# Patient Record
Sex: MALE | Race: WHITE | Employment: FULL TIME | URBAN - METROPOLITAN AREA
[De-identification: names, ages, dates, MRNs, and addresses within clinical notes are randomized per-mention and may not be internally consistent; named-entity substitution may affect disease eponyms.]

---

## 2017-01-10 ENCOUNTER — OFFICE VISIT (OUTPATIENT)
Dept: FAMILY MEDICINE CLINIC | Facility: CLINIC | Age: 67
End: 2017-01-10

## 2017-01-10 VITALS
HEART RATE: 66 BPM | WEIGHT: 220.81 LBS | DIASTOLIC BLOOD PRESSURE: 84 MMHG | TEMPERATURE: 98 F | RESPIRATION RATE: 17 BRPM | BODY MASS INDEX: 27.46 KG/M2 | HEIGHT: 75 IN | SYSTOLIC BLOOD PRESSURE: 143 MMHG

## 2017-01-10 DIAGNOSIS — M54.50 BILATERAL LOW BACK PAIN WITHOUT SCIATICA, UNSPECIFIED CHRONICITY: ICD-10-CM

## 2017-01-10 DIAGNOSIS — S99.922S FOOT INJURY, LEFT, SEQUELA: ICD-10-CM

## 2017-01-10 DIAGNOSIS — D36.10 SCHWANNOMA: ICD-10-CM

## 2017-01-10 DIAGNOSIS — I10 ESSENTIAL HYPERTENSION: ICD-10-CM

## 2017-01-10 DIAGNOSIS — M45.9 ANKYLOSING SPONDYLITIS (HCC): Primary | ICD-10-CM

## 2017-01-10 PROCEDURE — 99214 OFFICE O/P EST MOD 30 MIN: CPT | Performed by: FAMILY MEDICINE

## 2017-01-10 PROCEDURE — 99212 OFFICE O/P EST SF 10 MIN: CPT | Performed by: FAMILY MEDICINE

## 2017-01-10 NOTE — PROGRESS NOTES
HPI:    Patient ID: Katie Da Silva is a 73year old male. Low Back Pain  The current episode started more than 1 month ago. The problem occurs daily. The problem has been gradually improving since onset. The pain is present in the lumbar spine.  The quality Neck: No JVD present. Cardiovascular: Normal rate, regular rhythm, normal heart sounds and intact distal pulses. No murmur heard.   Pulmonary/Chest: Effort normal and breath sounds normal.   Musculoskeletal:   See below   Lymphadenopathy:     He has

## 2017-01-23 ENCOUNTER — OFFICE VISIT (OUTPATIENT)
Dept: RHEUMATOLOGY | Facility: CLINIC | Age: 67
End: 2017-01-23

## 2017-01-23 VITALS
SYSTOLIC BLOOD PRESSURE: 147 MMHG | BODY MASS INDEX: 27.69 KG/M2 | WEIGHT: 222.69 LBS | HEIGHT: 75 IN | DIASTOLIC BLOOD PRESSURE: 81 MMHG | HEART RATE: 64 BPM

## 2017-01-23 DIAGNOSIS — Z51.81 THERAPEUTIC DRUG MONITORING: ICD-10-CM

## 2017-01-23 DIAGNOSIS — M45.9 AS (ANKYLOSING SPONDYLITIS) (HCC): Primary | ICD-10-CM

## 2017-01-23 PROCEDURE — 99214 OFFICE O/P EST MOD 30 MIN: CPT | Performed by: INTERNAL MEDICINE

## 2017-01-23 PROCEDURE — 99212 OFFICE O/P EST SF 10 MIN: CPT | Performed by: INTERNAL MEDICINE

## 2017-01-24 NOTE — PATIENT INSTRUCTIONS
1. Cont prednisone 15mx 1 week, then cut prednisone to 10mg a day -   2. advil 200mg a day - 1-2 times a day  - ok to take at night. 3. Ok to try to taper Prednisone 10mg a day x 2 weeks, then try to cut to 5mg a day x weeks - -   4.  Return to clinic in

## 2017-01-24 NOTE — PROGRESS NOTES
Narcisa Mckeon is a 73year old male who presents for Patient presents with: Ankylosing Spondylitis  . HPI:     I had the pleasure of seeing Narcisa Mckeon on 12/19/2016 for evaluation. He's a pleasant 73year old who has joint pain and elevated sed rate. After he got back. He noticed more pain in his hisp in the care. Then his left knee started hurting. He has an injury of his left knee after slipping about 4 years ago. It has recovered. He has slipped  Twice since but it's recovrered.      About 1 month Body mass index is 27.84 kg/(m^2). Current Outpatient Prescriptions:  predniSONE 5 MG Oral Tab Take 4 tablets (20 mg total) by mouth daily. Take 4 tabs every day. (Patient taking differently: Take 15 mg by mouth daily.  Take 3 tabs every day. ) Disp: Other brother cancer passed away      Social History:    Smoking Status: Former Smoker                   Packs/Day: 0.00  Years:         Alcohol Use: Yes                Comment: wine, daily     ,   [employer],   [place]   Left elft 3rd toe - not tender , muich better - not swollne dactiylitisresolved  - not tender  -     Left heel not tender  No achiles tendnerss  No si joint tenderness at this time - but points to glueta larea   No hip tendneress  Can SED RATE      0-20 mm/Hr 36 (H)   C-REACTIVE PROTEIN      0.0-0.9 mg/dL 3.4 (H)   LYME SCREEN IgG AND IgM (S)      Negative Negative   URIC ACID      3.3-8.7 mg/dL 3.7   HEPATITIS C VIRUS ANTIBODY      Nonreactive Nonreactive     Component      Latest Ref    neural foraminal encroachment. Severe bilateral neural foraminal     impingement is also seen at L5-S1.    7/9/2016 - ct kidney  1. Right-sided nonobstructing renal calculi are present measuring up to 1.0     cm.     2.  There is an indeterminate 3.2 cm

## 2017-02-21 ENCOUNTER — OFFICE VISIT (OUTPATIENT)
Dept: FAMILY MEDICINE CLINIC | Facility: CLINIC | Age: 67
End: 2017-02-21

## 2017-02-21 VITALS
BODY MASS INDEX: 27.85 KG/M2 | WEIGHT: 224 LBS | TEMPERATURE: 99 F | DIASTOLIC BLOOD PRESSURE: 81 MMHG | HEART RATE: 67 BPM | HEIGHT: 75 IN | SYSTOLIC BLOOD PRESSURE: 142 MMHG | RESPIRATION RATE: 18 BRPM

## 2017-02-21 DIAGNOSIS — I10 ESSENTIAL HYPERTENSION: Primary | ICD-10-CM

## 2017-02-21 DIAGNOSIS — M45.9 ANKYLOSING SPONDYLITIS (HCC): ICD-10-CM

## 2017-02-21 DIAGNOSIS — S99.922S FOOT INJURY, LEFT, SEQUELA: ICD-10-CM

## 2017-02-21 DIAGNOSIS — D36.10 SCHWANNOMA: ICD-10-CM

## 2017-02-21 PROCEDURE — 99213 OFFICE O/P EST LOW 20 MIN: CPT | Performed by: FAMILY MEDICINE

## 2017-02-21 PROCEDURE — 99212 OFFICE O/P EST SF 10 MIN: CPT | Performed by: FAMILY MEDICINE

## 2017-02-22 NOTE — PROGRESS NOTES
HPI:    Patient ID: Kate Allison is a 73year old male. Hypertension  This is a chronic problem. The current episode started more than 1 month ago. The problem is unchanged. The problem is controlled.  Pertinent negatives include no anxiety, blurred visio rhythm, normal heart sounds and intact distal pulses. No murmur heard.   Pulmonary/Chest: Effort normal and breath sounds normal.   Musculoskeletal:   Range of motion lumbar spine normal.  Left foot with trace swelling distal dorsal.   Lymphadenopathy:

## 2017-02-23 ENCOUNTER — APPOINTMENT (OUTPATIENT)
Dept: LAB | Age: 67
End: 2017-02-23
Attending: INTERNAL MEDICINE
Payer: COMMERCIAL

## 2017-02-23 DIAGNOSIS — M45.9 AS (ANKYLOSING SPONDYLITIS) (HCC): ICD-10-CM

## 2017-02-23 LAB
CRP SERPL-MCNC: <0.5 MG/DL (ref 0–0.9)
ERYTHROCYTE [SEDIMENTATION RATE] IN BLOOD: 9 MM/HR (ref 0–20)

## 2017-02-23 PROCEDURE — 36415 COLL VENOUS BLD VENIPUNCTURE: CPT

## 2017-02-23 PROCEDURE — 86140 C-REACTIVE PROTEIN: CPT

## 2017-02-23 PROCEDURE — 85652 RBC SED RATE AUTOMATED: CPT

## 2017-02-27 ENCOUNTER — OFFICE VISIT (OUTPATIENT)
Dept: RHEUMATOLOGY | Facility: CLINIC | Age: 67
End: 2017-02-27

## 2017-02-27 VITALS
BODY MASS INDEX: 27.77 KG/M2 | WEIGHT: 223.38 LBS | HEIGHT: 75 IN | SYSTOLIC BLOOD PRESSURE: 150 MMHG | HEART RATE: 69 BPM | DIASTOLIC BLOOD PRESSURE: 83 MMHG

## 2017-02-27 DIAGNOSIS — Z51.81 THERAPEUTIC DRUG MONITORING: ICD-10-CM

## 2017-02-27 DIAGNOSIS — M45.9 AS (ANKYLOSING SPONDYLITIS) (HCC): Primary | ICD-10-CM

## 2017-02-27 PROCEDURE — 99214 OFFICE O/P EST MOD 30 MIN: CPT | Performed by: INTERNAL MEDICINE

## 2017-02-27 PROCEDURE — 99212 OFFICE O/P EST SF 10 MIN: CPT | Performed by: INTERNAL MEDICINE

## 2017-02-27 RX ORDER — PREDNISONE 2.5 MG
2.5 TABLET ORAL DAILY
Qty: 30 TABLET | Refills: 2 | Status: SHIPPED | OUTPATIENT
Start: 2017-02-27 | End: 2017-05-03

## 2017-02-27 RX ORDER — PREDNISONE 1 MG/1
5 TABLET ORAL DAILY
COMMUNITY
End: 2017-05-03

## 2017-02-27 NOTE — PATIENT INSTRUCTIONS
1. Cont prednisone  - ok to cut to 2.5mg a day - x 1 -2 month and then off -   2. advil 200mg a day - 1-2 times a day  - can take advil at night - cont this   3. Return to clinic in 6-8 weeks.    4. Consider sulfasalazine in future

## 2017-02-27 NOTE — PROGRESS NOTES
Benton Schwarz is a 73year old male who presents for Patient presents with: Ankylosing Spondylitis  . HPI:     I had the pleasure of seeing Benton Schwarz on 12/19/2016 for evaluation. He's a pleasant 73year old who has joint pain and elevated sed rate. After he got back. He noticed more pain in his hisp in the care. Then his left knee started hurting. He has an injury of his left knee after slipping about 4 years ago. It has recovered. He has slipped  Twice since but it's recovrered.      About 1 month hes' been on prednisone 5mg a day for the last 2 weeks. He ehas been havign 1/10 pain - mild over the top so fhis hips and his neck. He also has foot pain. He felt a little sitffer. If he stays active. He's been taking one advil at night.    He saw dr. Mariia Law Family History   Problem Relation Age of Onset   • Heart Disease Father 78     congestive heart failure (78 onset); triple bypass surgery   • Cancer Father      lung   • Heart Disorder Father      coronary bypass   • arthritis[other] [OTHER] Father    • St All other ROS are negative.      EXAM:   /83 mmHg  Pulse 69  Ht 6' 3\" (1.905 m)  Wt 223 lb 6.4 oz (101.334 kg)  BMI 27.92 kg/m2  HEENT: Clear oropharynx, no oral ulcers, EOM intact, clear sclear, PERRLA, pleasant, no acute distress, no CAD, no neck t Mean Corpuscular Hemoglobin      27.0-32.0 pg 29.2   Mean Corpuscular HGB Conc      32.0-37.0 g/dl 33.3   RDW      11.0-15.0 % 14.4   Platelet Count      827-966 K/   MEAN PLATELET VOLUME      7.4-10.3 fL 7.0 (L)   Neutrophils %       75   Lymphocyte    heterogeneous ovoid lesion extending into the right psoas musculature.     This measures up to 3.9 cm in greatest dimension and appears to     communicate with the right L2-L3 neural foramen.  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

## 2017-05-03 ENCOUNTER — OFFICE VISIT (OUTPATIENT)
Dept: RHEUMATOLOGY | Facility: CLINIC | Age: 67
End: 2017-05-03

## 2017-05-03 VITALS
BODY MASS INDEX: 28.09 KG/M2 | SYSTOLIC BLOOD PRESSURE: 156 MMHG | HEART RATE: 54 BPM | WEIGHT: 225.88 LBS | TEMPERATURE: 98 F | DIASTOLIC BLOOD PRESSURE: 86 MMHG | HEIGHT: 75 IN

## 2017-05-03 DIAGNOSIS — M45.9 ANKYLOSING SPONDYLITIS, UNSPECIFIED SITE OF SPINE (HCC): Primary | ICD-10-CM

## 2017-05-03 DIAGNOSIS — Z51.81 THERAPEUTIC DRUG MONITORING: ICD-10-CM

## 2017-05-03 PROCEDURE — 99214 OFFICE O/P EST MOD 30 MIN: CPT | Performed by: INTERNAL MEDICINE

## 2017-05-03 PROCEDURE — 99213 OFFICE O/P EST LOW 20 MIN: CPT | Performed by: INTERNAL MEDICINE

## 2017-05-03 NOTE — PROGRESS NOTES
iJmbo Lozano is a 78year old male who presents for Patient presents with: Ankylosing Spondylitis  . HPI:     I had the pleasure of seeing Jimbo Lozano on 12/19/2016 for evaluation. He's a pleasant 73year old who has joint pain and elevated sed rate. After he got back. He noticed more pain in his hisp in the care. Then his left knee started hurting. He has an injury of his left knee after slipping about 4 years ago. It has recovered. He has slipped  Twice since but it's recovrered.      About 1 month hes' been on prednisone 5mg a day for the last 2 weeks. He ehas been havign 1/10 pain - mild over the top so fhis hips and his neck. He also has foot pain. He felt a little sitffer. If he stays active. He's been taking one advil at night.    He saw dr. Christa Ferrer Current Outpatient Prescriptions:  TURMERIC OR Take by mouth. Take 1 cap every day. Disp:  Rfl:    Misc Natural Products (GLUCOSAMINE CHONDROITIN MSM OR) Take by mouth daily. Take 2 tabs daily Disp:  Rfl:    NON FORMULARY Zeaxanthin take one tab every day. Just got back from cruise in march - [place], [address] [facility] [place],         REVIEW OF SYSTEMS:   Review Of Systems:  Fatigue  Constitutional:No fever, no change in weight or appetitie, was having night sweats - a coupl Left heel not tender  No achiles tendnerss  No si joint tenderness   No hip tendneress  Can touch toes   schober's 10-13cm  Last time -   Good rom - good ext and internal rotation of hips       Component      Latest Ref Rng 12/7/2016   Glucose      70-99 m Component      Latest Ref Rng 12/19/2016 12/7/2016 8/14/2016   ANTI-SJOGREN'S A (S)      Negative Negative     ANTI-SJOGREN'S B (S)      Negative Negative     SED RATE (ESR) (L)      0 - 20 MM/HR   19   SED RATE      0-20 mm/Hr  36 (H)    C-REACTIVE PROTEI 1. Right-sided nonobstructing renal calculi are present measuring up to 1.0     cm.     2. There is an indeterminate 3.2 cm fluid collection in the medial right     psoas extending towards the right L3-L4 neural foramen.  This could     represent a perineur

## 2017-05-20 ENCOUNTER — APPOINTMENT (OUTPATIENT)
Dept: LAB | Age: 67
End: 2017-05-20
Attending: INTERNAL MEDICINE
Payer: COMMERCIAL

## 2017-05-20 ENCOUNTER — HOSPITAL ENCOUNTER (OUTPATIENT)
Age: 67
Discharge: HOME | End: 2017-05-20
Payer: COMMERCIAL

## 2017-05-20 VITALS
HEART RATE: 81 BPM | OXYGEN SATURATION: 97 % | TEMPERATURE: 99 F | DIASTOLIC BLOOD PRESSURE: 83 MMHG | RESPIRATION RATE: 20 BRPM | SYSTOLIC BLOOD PRESSURE: 162 MMHG

## 2017-05-20 DIAGNOSIS — H10.31 ACUTE CONJUNCTIVITIS OF RIGHT EYE, UNSPECIFIED ACUTE CONJUNCTIVITIS TYPE: Primary | ICD-10-CM

## 2017-05-20 DIAGNOSIS — M45.9 ANKYLOSING SPONDYLITIS, UNSPECIFIED SITE OF SPINE (HCC): ICD-10-CM

## 2017-05-20 PROCEDURE — 99204 OFFICE O/P NEW MOD 45 MIN: CPT

## 2017-05-20 PROCEDURE — 86140 C-REACTIVE PROTEIN: CPT

## 2017-05-20 PROCEDURE — 80053 COMPREHEN METABOLIC PANEL: CPT

## 2017-05-20 PROCEDURE — 85652 RBC SED RATE AUTOMATED: CPT

## 2017-05-20 PROCEDURE — 36415 COLL VENOUS BLD VENIPUNCTURE: CPT

## 2017-05-20 PROCEDURE — 99213 OFFICE O/P EST LOW 20 MIN: CPT

## 2017-05-20 RX ORDER — PURIFIED WATER 986 MG/ML
2 SOLUTION OPHTHALMIC ONCE
Status: COMPLETED | OUTPATIENT
Start: 2017-05-20 | End: 2017-05-20

## 2017-05-20 RX ORDER — GENTAMICIN SULFATE 3 MG/ML
2 SOLUTION/ DROPS OPHTHALMIC 3 TIMES DAILY
Qty: 5 ML | Refills: 0 | Status: SHIPPED | OUTPATIENT
Start: 2017-05-20 | End: 2017-05-25

## 2017-05-20 NOTE — ED PROVIDER NOTES
Patient Seen in: [address]    History   Patient presents with:   Eye Visual Problem (opthalmic)    Stated Complaint: right eye pink     HPI    Patient is a 79-year-old male that complains of some redness irritation and c Diabetes Mother 63     64 (onset)   • Dementia Sister 71     Alzheimer's disease   • Other[other] [OTHER] Sister    • Cancer Brother 79     cancer - brain tumor         Smoking Status: Former Smoker                   Packs/Day: 0.00  Years:         Alcohol

## 2017-05-20 NOTE — ED INITIAL ASSESSMENT (HPI)
Patient states having redness in right eye since Tuesday, drainage/crustiness noted to right eye. Patient c/o burning feeling, itchiness and blurriness to right eye. Patient does not wear contact lenses. Patient denies fever.

## 2017-05-25 ENCOUNTER — TELEPHONE (OUTPATIENT)
Dept: RHEUMATOLOGY | Facility: CLINIC | Age: 67
End: 2017-05-25

## 2017-06-14 NOTE — PATIENT INSTRUCTIONS
1. Add advil 200mg twice a day   2. Follow up with opthalmologist - dr. Mireya Longoria. Ana Lilia York   3. Check labs  4. Follow up in 3 months. opthalmologist -   [facility] [address], [address]  Phone: [phone]  Fax: [phone] Astrocytoma

## 2017-10-19 ENCOUNTER — NURSE TRIAGE (OUTPATIENT)
Dept: OTHER | Age: 67
End: 2017-10-19

## 2017-10-20 ENCOUNTER — OFFICE VISIT (OUTPATIENT)
Dept: FAMILY MEDICINE CLINIC | Facility: CLINIC | Age: 67
End: 2017-10-20

## 2017-10-20 VITALS
SYSTOLIC BLOOD PRESSURE: 161 MMHG | TEMPERATURE: 99 F | HEIGHT: 75 IN | HEART RATE: 63 BPM | DIASTOLIC BLOOD PRESSURE: 84 MMHG

## 2017-10-20 DIAGNOSIS — J00 NASOPHARYNGITIS ACUTE: Primary | ICD-10-CM

## 2017-10-20 DIAGNOSIS — R03.0 ELEVATED BLOOD-PRESSURE READING WITHOUT DIAGNOSIS OF HYPERTENSION: ICD-10-CM

## 2017-10-20 PROCEDURE — 99214 OFFICE O/P EST MOD 30 MIN: CPT | Performed by: NURSE PRACTITIONER

## 2017-10-20 NOTE — PATIENT INSTRUCTIONS
UPPER RESPIRATORY INFECTION-VIRAL  (COLD)    Colds are due to viral infections and are very common. Sore throat is a prominent, and often the first, symptom.  The cough that accompanies most colds is annoying but helps physiologically to protect the lungs a

## 2017-10-20 NOTE — PROGRESS NOTES
HPI  Pt here for for sore throat and congestion for past 5 days. Hurts to swallow and feeling fatigued. Is taking otc vitamins and zinc. S/s haven't improved much-will be leaving for [place] in 6 days.       Review of Systems   Constitutional: Positive for status: Former Smoker     Smokeless tobacco: Former User    Alcohol use Yes    Comment: wine, daily    Drug use: No    Sexual activity: Not on file     Other Topics Concern    Caffeine Concern Yes    Comment: coffee     Social History Narrative    ** Merge no discharge. Left eye exhibits no discharge. Neck: Normal range of motion. Neck supple. No thyromegaly present. Cardiovascular: Normal rate, regular rhythm and normal heart sounds. No murmur heard.   Pulmonary/Chest: Effort normal and breath sounds

## 2017-10-24 ENCOUNTER — NURSE TRIAGE (OUTPATIENT)
Dept: FAMILY MEDICINE CLINIC | Facility: CLINIC | Age: 67
End: 2017-10-24

## 2017-10-24 RX ORDER — AMOXICILLIN AND CLAVULANATE POTASSIUM 875; 125 MG/1; MG/1
1 TABLET, FILM COATED ORAL 2 TIMES DAILY
Qty: 20 TABLET | Refills: 0 | Status: SHIPPED | OUTPATIENT
Start: 2017-10-24 | End: 2017-11-03

## 2017-10-24 NOTE — TELEPHONE ENCOUNTER
Action Requested: Summary for Provider     []  Critical Lab, Recommendations Needed  [x] Need Additional Advice  []   FYI    []   Need Orders  [x] Need Medications Sent to Pharmacy  []  Other     SUMMARY: ADVICE NEEDED, congestion, cough    Pt states he sa

## 2017-10-24 NOTE — TELEPHONE ENCOUNTER
Spoke with patient. His symptoms have worsened and complains of pain in maxillary sinuses radiating to upper teeth, yellow and green mucus and cough.   He is also concerned due to history of pneumonia in past.  Rx for Augmentin 875 mg BID for 10 days sent

## 2017-10-27 NOTE — TELEPHONE ENCOUNTER
Action Requested: Summary for Provider     []  Critical Lab, Recommendations Needed  [] Need Additional Advice  []   FYI    []   Need Orders  [] Need Medications Sent to Pharmacy  []  Other     SUMMARY: Pt stts has had a sore throat x 1 week.  Not going raz oral

## 2017-11-30 ENCOUNTER — TELEPHONE (OUTPATIENT)
Dept: GASTROENTEROLOGY | Facility: CLINIC | Age: 67
End: 2017-11-30

## 2017-11-30 NOTE — TELEPHONE ENCOUNTER
----- Message from Paramjit Olsen RN sent at 7/16/2015  2:46 PM CDT -----  Regarding: Recall Colon  Recall colon in 3 years per CB.  Colon done 12/30/14

## 2018-01-30 ENCOUNTER — TELEPHONE (OUTPATIENT)
Dept: GASTROENTEROLOGY | Facility: CLINIC | Age: 68
End: 2018-01-30

## 2018-10-27 ENCOUNTER — TELEPHONE (OUTPATIENT)
Dept: RHEUMATOLOGY | Facility: CLINIC | Age: 68
End: 2018-10-27

## 2018-10-27 NOTE — TELEPHONE ENCOUNTER
Pt's wife called in to have Pt scheduled in for Tuesday with Dr. Kira Royal. She states that he is experiencing a flare up and has tremendous pain currently. Please advise if Pt can be added, he prefers the day of Tuesday but can make anything work.      Please

## 2018-10-29 RX ORDER — NAPROXEN 500 MG/1
500 TABLET ORAL 2 TIMES DAILY WITH MEALS
Qty: 60 TABLET | Refills: 0 | Status: SHIPPED | OUTPATIENT
Start: 2018-10-29 | End: 2018-11-28

## 2018-10-29 NOTE — TELEPHONE ENCOUNTER
Will send in prescription strength aleve - will send to his local pharmacy - he should stop over the counter -   Will see if that helps -   11/2 is the earliest  - unless he wants to see Dr. Kristin Gamble.    Please let him know

## 2018-10-29 NOTE — TELEPHONE ENCOUNTER
Pt states he is experiencing lower back pain- he thinks he may have slipped a disc. Pt reports \"pinching, electric shock\" sensation in coccyx area radiating to right hip.  Pain is intermittent and sharp, related to changing positions from sitting to stand

## 2018-10-29 NOTE — TELEPHONE ENCOUNTER
Contacted pt to inform naproxen 500mg has been sent in to pharmacy. Directions for use discussed, pt instructecd to stop OTC NSAIDs- pt verbalizes understanding. Pt states he would like to keep f/u appt with Dr. Beaulieu for 11/2.  Pt instructed to call office if

## 2018-11-02 ENCOUNTER — HOSPITAL ENCOUNTER (OUTPATIENT)
Dept: GENERAL RADIOLOGY | Facility: HOSPITAL | Age: 68
Discharge: HOME | End: 2018-11-02
Attending: INTERNAL MEDICINE
Payer: COMMERCIAL

## 2018-11-02 ENCOUNTER — APPOINTMENT (OUTPATIENT)
Dept: LAB | Facility: HOSPITAL | Age: 68
End: 2018-11-02
Attending: INTERNAL MEDICINE
Payer: COMMERCIAL

## 2018-11-02 ENCOUNTER — OFFICE VISIT (OUTPATIENT)
Dept: RHEUMATOLOGY | Facility: CLINIC | Age: 68
End: 2018-11-02
Payer: COMMERCIAL

## 2018-11-02 VITALS
WEIGHT: 225 LBS | HEART RATE: 66 BPM | BODY MASS INDEX: 27.98 KG/M2 | HEIGHT: 75 IN | SYSTOLIC BLOOD PRESSURE: 168 MMHG | DIASTOLIC BLOOD PRESSURE: 85 MMHG

## 2018-11-02 DIAGNOSIS — M54.9 RIGHT-SIDED BACK PAIN, UNSPECIFIED BACK LOCATION, UNSPECIFIED CHRONICITY: ICD-10-CM

## 2018-11-02 DIAGNOSIS — M45.9 ANKYLOSING SPONDYLITIS, UNSPECIFIED SITE OF SPINE (HCC): ICD-10-CM

## 2018-11-02 DIAGNOSIS — Z51.81 THERAPEUTIC DRUG MONITORING: ICD-10-CM

## 2018-11-02 DIAGNOSIS — M45.9 ANKYLOSING SPONDYLITIS, UNSPECIFIED SITE OF SPINE (HCC): Primary | ICD-10-CM

## 2018-11-02 PROCEDURE — 99214 OFFICE O/P EST MOD 30 MIN: CPT | Performed by: INTERNAL MEDICINE

## 2018-11-02 PROCEDURE — 85652 RBC SED RATE AUTOMATED: CPT

## 2018-11-02 PROCEDURE — 86140 C-REACTIVE PROTEIN: CPT

## 2018-11-02 PROCEDURE — 85027 COMPLETE CBC AUTOMATED: CPT

## 2018-11-02 PROCEDURE — 36415 COLL VENOUS BLD VENIPUNCTURE: CPT

## 2018-11-02 PROCEDURE — 72202 X-RAY EXAM SI JOINTS 3/> VWS: CPT | Performed by: INTERNAL MEDICINE

## 2018-11-02 PROCEDURE — 99212 OFFICE O/P EST SF 10 MIN: CPT | Performed by: INTERNAL MEDICINE

## 2018-11-02 PROCEDURE — 72110 X-RAY EXAM L-2 SPINE 4/>VWS: CPT | Performed by: INTERNAL MEDICINE

## 2018-11-02 PROCEDURE — 80053 COMPREHEN METABOLIC PANEL: CPT

## 2018-11-02 RX ORDER — CYCLOBENZAPRINE HCL 5 MG
TABLET ORAL
Qty: 60 TABLET | Refills: 0 | Status: SHIPPED | OUTPATIENT
Start: 2018-11-02 | End: 2018-11-28

## 2018-11-02 NOTE — PROGRESS NOTES
Reji Coello is a 80year old male who presents for Patient presents with: Ankylosing Spondylitis  Back Pain  Foot Pain: right  Knee Pain: left  . HPI:     I had the pleasure of seeing Reji Coello on 12/19/2016 for evaluation.      He's a pleasant 66 year got back. He noticed more pain in his hisp in the care. Then his left knee started hurting. He has an injury of his left knee after slipping about 4 years ago. It has recovered. He has slipped  Twice since but it's recovrered.      About 1 month before, paola has foot pain. He felt a little sitffer. If he stays active. He's been taking one advil at night. He saw dr. Korey Teixeira last week. He has been having less foot pain - he's using a left foot toe liriano that cushion his left 3rd toe.    Dr. Korey Teixeira feels thi of concrete at an angle and his back might have gotten hurt after that but it wasn't right away. Milena Hanna He feels it's an electric shock pain down his right side of his lower back. It comes from his coccyx and goes to his right side of back.    He is taking napor mouth 2 (two) times daily. Disp:  Rfl:    Multiple Vitamin (MULTI-VITAMIN DAILY) Oral Tab Take  by mouth. Disp:  Rfl:    aspirin 81 MG Oral Tab Take 1 tablet by mouth daily.  Disp: 30 tablet Rfl: 0      Past Medical History:   Diagnosis Date   • Glaucoma no heartburn, no dyshpagia, no BRBPR or melena  : has a kdiney stone noted since 2007 - and recent ct,    Neuro: No numbness or tingling, was getting headache radiating to his forehead and - not templs - but scalp was hurting. , no hx of seizures,   Psyc PHOSPHATASE       U/L 66   Total Bilirubin      0.3-1.2 mg/dL 0.8   TOTAL PROTEIN      5.9-8.4 g/dL 7.2   Albumin      3.5-4.8 g/dL 3.7   GLOBULIN, TOTAL      2.5-3.7 g/dL 3.5   A/G RATIO      1.0-2.0 1.1   ANION GAP      0-18 10   BUN/CREA RATIO FACTOR      <11 IU/mL <5       Component      Latest Ref Rng 2/23/2017   C-REACTIVE PROTEIN      0.0-0.9 mg/dL <0.5   SED RATE      0-20 mm/Hr 9     12/7/2016 - left foot - Normal examination.   12/8/2016 - ct chest       Numerous bilateral pulmonary nodule year old male who presents for Patient presents with: Ankylosing Spondylitis  Back Pain  Foot Pain: right  Knee Pain: left      1.  Ankylosing spondylitis - reactive arthtiits - hlab27 positive - polyarthrlagia  - rsolved and then recently had recurrence o

## 2018-11-28 ENCOUNTER — OFFICE VISIT (OUTPATIENT)
Dept: RHEUMATOLOGY | Facility: CLINIC | Age: 68
End: 2018-11-28
Payer: COMMERCIAL

## 2018-11-28 ENCOUNTER — HOSPITAL ENCOUNTER (OUTPATIENT)
Dept: GENERAL RADIOLOGY | Facility: HOSPITAL | Age: 68
Discharge: HOME | End: 2018-11-28
Attending: INTERNAL MEDICINE
Payer: COMMERCIAL

## 2018-11-28 VITALS
BODY MASS INDEX: 27.98 KG/M2 | HEIGHT: 75 IN | HEART RATE: 68 BPM | WEIGHT: 225 LBS | SYSTOLIC BLOOD PRESSURE: 159 MMHG | DIASTOLIC BLOOD PRESSURE: 94 MMHG

## 2018-11-28 DIAGNOSIS — M79.671 RIGHT FOOT PAIN: ICD-10-CM

## 2018-11-28 DIAGNOSIS — Z51.81 THERAPEUTIC DRUG MONITORING: ICD-10-CM

## 2018-11-28 DIAGNOSIS — M45.9 ANKYLOSING SPONDYLITIS, UNSPECIFIED SITE OF SPINE (HCC): Primary | ICD-10-CM

## 2018-11-28 PROCEDURE — 99214 OFFICE O/P EST MOD 30 MIN: CPT | Performed by: INTERNAL MEDICINE

## 2018-11-28 PROCEDURE — 73630 X-RAY EXAM OF FOOT: CPT | Performed by: INTERNAL MEDICINE

## 2018-11-28 PROCEDURE — 99212 OFFICE O/P EST SF 10 MIN: CPT | Performed by: INTERNAL MEDICINE

## 2018-11-28 RX ORDER — NAPROXEN 500 MG/1
500 TABLET ORAL 2 TIMES DAILY WITH MEALS
Qty: 60 TABLET | Refills: 0 | Status: SHIPPED | OUTPATIENT
Start: 2018-11-28 | End: 2020-02-12

## 2018-11-28 RX ORDER — CYCLOBENZAPRINE HCL 5 MG
TABLET ORAL
Qty: 60 TABLET | Refills: 0 | Status: SHIPPED | OUTPATIENT
Start: 2018-11-28 | End: 2020-02-12

## 2018-11-28 NOTE — PROGRESS NOTES
Donald Coon is a 80year old male who presents for Patient presents with: Ankylosing Spondylitis  . HPI:     I had the pleasure of seeing Donald Coon on 12/19/2016 for evaluation. He's a pleasant 73year old who has joint pain and elevated sed rate. the care. Then his left knee started hurting. He has an injury of his left knee after slipping about 4 years ago. It has recovered. He has slipped  Twice since but it's recovrered. About 1 month before, his left 3rd toe got swollen.  It's reddish and prednisone effects. 5/2016 - he had albuterol. 11/2/2018  He's been on turmeric and helping him. He had swellign in his left foot stopped - this was his initial complaint.    He ended up tapering off the prednisone - he used most of it in the last every day. Disp:  Rfl:    Misc Natural Products (GLUCOSAMINE CHONDROITIN MSM OR) Take by mouth daily. Take 2 tabs daily Disp:  Rfl:    NON FORMULARY Zeaxanthin take one tab every day.   Disp:  Rfl:    Cholecalciferol (VITAMIN D3) 5000 UNITS Oral Cap Take 5 Just got back from cruise in march - [place], [address] [facility],         REVIEW OF SYSTEMS:   Review Of Systems:  Fatigue  Constitutional:No fever, no change in weight or appetitie, was having night sweats - a cou not tender.    Left elft 3rd toe - not tender , muich better - not swollne dactiylitisresolved  -    Left heel not tender  No achiles tendnerss  No si joint tenderness   No hip tendneress  Can touch toes   schober's 10-13cm  Last time -   Good rom - good ex ACID      3.3-8.7 mg/dL 3.7   HEPATITIS C VIRUS ANTIBODY      Nonreactive Nonreactive     Component      Latest Ref Rng 12/19/2016 12/7/2016 8/14/2016   ANTI-SJOGREN'S A (S)      Negative Negative     ANTI-SJOGREN'S B (S)      Negative Negative     SED RAT - 15.0 % 14.3   Platelet Count      962 - 400 K/   MEAN PLATELET VOLUME      7.4 - 10.3 fL 7.2 (L)   C-REACTIVE PROTEIN      0.0 - 0.9 mg/dL 2.2 (H)   SED RATE      0 - 20 mm/Hr 18     12/7/2016 - left foot - Normal examination.   12/8/2016 - ct chest above.    11/2/2018 si joint xray   CONCLUSION:   1. Mild DJD. 2. No evidence of joint fusion. 11/2/2018 - lumbar xray   1. DJD lumbosacral spine. ASSESSMENT AND PLAN:   Carmita Arauz is a 80year old male who presents for Patient presents with:   Ankylo

## 2018-11-28 NOTE — PATIENT INSTRUCTIONS
1. Cont naproxen 500mg twice a day   2. Flexeril 5mg -10mg at night -   3. Monitor right foot = check right foot xray - consider a right foot MRI -   4. Return to clinic in 6months.

## 2018-11-29 ENCOUNTER — TELEPHONE (OUTPATIENT)
Dept: RHEUMATOLOGY | Facility: CLINIC | Age: 68
End: 2018-11-29

## 2019-07-05 DIAGNOSIS — Z86.010 HISTORY OF COLON POLYPS: Primary | ICD-10-CM

## 2019-09-09 NOTE — PATIENT INSTRUCTIONS
1. Cont naproxen 500mg twice a day   2. Check labs  3. Check si joint xray and lumbar xray   4. Flexeril 5mg -10mg at night -   5. Return to clinic in 4 weeks. Hatchet Flap Text: The defect edges were debeveled with a #15 scalpel blade.  Given the location of the defect, shape of the defect and the proximity to free margins a hatchet flap was deemed most appropriate.  Using a sterile surgical marker, an appropriate hatchet flap was drawn incorporating the defect and placing the expected incisions within the relaxed skin tension lines where possible.    The area thus outlined was incised deep to adipose tissue with a #15 scalpel blade.  The skin margins were undermined to an appropriate distance in all directions utilizing iris scissors.

## 2019-09-16 ENCOUNTER — OFFICE VISIT (OUTPATIENT)
Dept: FAMILY MEDICINE CLINIC | Facility: CLINIC | Age: 69
End: 2019-09-16
Payer: COMMERCIAL

## 2019-09-16 ENCOUNTER — HOSPITAL ENCOUNTER (OUTPATIENT)
Dept: CT IMAGING | Facility: HOSPITAL | Age: 69
Discharge: HOME | End: 2019-09-16
Attending: FAMILY MEDICINE
Payer: COMMERCIAL

## 2019-09-16 ENCOUNTER — LAB ENCOUNTER (OUTPATIENT)
Dept: LAB | Age: 69
End: 2019-09-16
Attending: FAMILY MEDICINE
Payer: COMMERCIAL

## 2019-09-16 ENCOUNTER — NURSE TRIAGE (OUTPATIENT)
Dept: FAMILY MEDICINE CLINIC | Facility: CLINIC | Age: 69
End: 2019-09-16

## 2019-09-16 VITALS
HEART RATE: 64 BPM | BODY MASS INDEX: 26 KG/M2 | SYSTOLIC BLOOD PRESSURE: 151 MMHG | TEMPERATURE: 98 F | WEIGHT: 204.19 LBS | DIASTOLIC BLOOD PRESSURE: 78 MMHG

## 2019-09-16 DIAGNOSIS — R31.0 GROSS HEMATURIA: ICD-10-CM

## 2019-09-16 DIAGNOSIS — N50.89 SCROTAL MASS: ICD-10-CM

## 2019-09-16 DIAGNOSIS — I10 ESSENTIAL HYPERTENSION: ICD-10-CM

## 2019-09-16 DIAGNOSIS — R31.9 HEMATURIA, UNSPECIFIED TYPE: ICD-10-CM

## 2019-09-16 DIAGNOSIS — R31.9 HEMATURIA, UNSPECIFIED TYPE: Primary | ICD-10-CM

## 2019-09-16 LAB
ALBUMIN SERPL-MCNC: 3.7 G/DL (ref 3.4–5)
ALBUMIN/GLOB SERPL: 1.1 {RATIO} (ref 1–2)
ALP LIVER SERPL-CCNC: 79 U/L (ref 45–117)
ALT SERPL-CCNC: 25 U/L (ref 16–61)
ANION GAP SERPL CALC-SCNC: 6 MMOL/L (ref 0–18)
AST SERPL-CCNC: 24 U/L (ref 15–37)
BASOPHILS # BLD AUTO: 0.04 X10(3) UL (ref 0–0.2)
BASOPHILS NFR BLD AUTO: 0.8 %
BILIRUB SERPL-MCNC: 0.5 MG/DL (ref 0.1–2)
BILIRUB UR QL: NEGATIVE
BILIRUBIN: NEGATIVE
BUN BLD-MCNC: 14 MG/DL (ref 7–18)
BUN/CREAT SERPL: 15.6 (ref 10–20)
CALCIUM BLD-MCNC: 9 MG/DL (ref 8.5–10.1)
CHLORIDE SERPL-SCNC: 111 MMOL/L (ref 98–112)
CO2 SERPL-SCNC: 27 MMOL/L (ref 21–32)
COLOR UR: YELLOW
COMPLEXED PSA SERPL-MCNC: 0.72 NG/ML (ref ?–4)
CREAT BLD-MCNC: 0.9 MG/DL (ref 0.7–1.3)
DEPRECATED RDW RBC AUTO: 45.8 FL (ref 35.1–46.3)
EOSINOPHIL # BLD AUTO: 0.03 X10(3) UL (ref 0–0.7)
EOSINOPHIL NFR BLD AUTO: 0.6 %
ERYTHROCYTE [DISTWIDTH] IN BLOOD BY AUTOMATED COUNT: 13.8 % (ref 11–15)
GLOBULIN PLAS-MCNC: 3.5 G/DL (ref 2.8–4.4)
GLUCOSE (URINE DIPSTICK): NEGATIVE MG/DL
GLUCOSE BLD-MCNC: 82 MG/DL (ref 70–99)
GLUCOSE UR-MCNC: NEGATIVE MG/DL
HCT VFR BLD AUTO: 40.5 % (ref 39–53)
HGB BLD-MCNC: 13.6 G/DL (ref 13–17.5)
IMM GRANULOCYTES # BLD AUTO: 0.01 X10(3) UL (ref 0–1)
IMM GRANULOCYTES NFR BLD: 0.2 %
KETONES (URINE DIPSTICK): NEGATIVE MG/DL
KETONES UR-MCNC: NEGATIVE MG/DL
LEUKOCYTE ESTERASE UR QL STRIP.AUTO: NEGATIVE
LYMPHOCYTES # BLD AUTO: 1.12 X10(3) UL (ref 1–4)
LYMPHOCYTES NFR BLD AUTO: 23.4 %
M PROTEIN MFR SERPL ELPH: 7.2 G/DL (ref 6.4–8.2)
MCH RBC QN AUTO: 30.4 PG (ref 26–34)
MCHC RBC AUTO-ENTMCNC: 33.6 G/DL (ref 31–37)
MCV RBC AUTO: 90.4 FL (ref 80–100)
MONOCYTES # BLD AUTO: 0.38 X10(3) UL (ref 0.1–1)
MONOCYTES NFR BLD AUTO: 7.9 %
MULTISTIX LOT#: NORMAL NUMERIC
NEUTROPHILS # BLD AUTO: 3.21 X10 (3) UL (ref 1.5–7.7)
NEUTROPHILS # BLD AUTO: 3.21 X10(3) UL (ref 1.5–7.7)
NEUTROPHILS NFR BLD AUTO: 67.1 %
NITRITE UR QL STRIP.AUTO: NEGATIVE
NITRITE, URINE: NEGATIVE
OSMOLALITY SERPL CALC.SUM OF ELEC: 298 MOSM/KG (ref 275–295)
PATIENT FASTING: YES
PH UR: 6 [PH] (ref 5–8)
PH, URINE: 7 (ref 4.5–8)
PLATELET # BLD AUTO: 332 10(3)UL (ref 150–450)
POTASSIUM SERPL-SCNC: 4 MMOL/L (ref 3.5–5.1)
PROT UR-MCNC: 30 MG/DL
RBC # BLD AUTO: 4.48 X10(6)UL (ref 3.8–5.8)
RBC #/AREA URNS AUTO: 283 /HPF
SODIUM SERPL-SCNC: 144 MMOL/L (ref 136–145)
SP GR UR STRIP: 1.01 (ref 1–1.03)
SPECIFIC GRAVITY: 1.01 (ref 1–1.03)
UROBILINOGEN UR STRIP-ACNC: <2
VIT C UR-MCNC: 20 MG/DL
WBC # BLD AUTO: 4.8 X10(3) UL (ref 4–11)
WBC #/AREA URNS AUTO: 5 /HPF

## 2019-09-16 PROCEDURE — 90662 IIV NO PRSV INCREASED AG IM: CPT | Performed by: FAMILY MEDICINE

## 2019-09-16 PROCEDURE — 90471 IMMUNIZATION ADMIN: CPT | Performed by: FAMILY MEDICINE

## 2019-09-16 PROCEDURE — 74176 CT ABD & PELVIS W/O CONTRAST: CPT | Performed by: FAMILY MEDICINE

## 2019-09-16 PROCEDURE — 99214 OFFICE O/P EST MOD 30 MIN: CPT | Performed by: FAMILY MEDICINE

## 2019-09-16 PROCEDURE — 81002 URINALYSIS NONAUTO W/O SCOPE: CPT | Performed by: FAMILY MEDICINE

## 2019-09-16 PROCEDURE — 81001 URINALYSIS AUTO W/SCOPE: CPT

## 2019-09-16 PROCEDURE — 87086 URINE CULTURE/COLONY COUNT: CPT

## 2019-09-16 PROCEDURE — 36415 COLL VENOUS BLD VENIPUNCTURE: CPT

## 2019-09-16 PROCEDURE — 85025 COMPLETE CBC W/AUTO DIFF WBC: CPT

## 2019-09-16 PROCEDURE — 80053 COMPREHEN METABOLIC PANEL: CPT

## 2019-09-16 NOTE — TELEPHONE ENCOUNTER
appt made -noticed blood in urine today, was dark on yesterday - no burning   Reason for Disposition  • Patient wants to be seen    Protocols used: URINE - BLOOD IN-A-OH

## 2019-09-16 NOTE — PROGRESS NOTES
HPI:    Patient ID: Armani Cisneros is a 77year old male. Hematuria   This is a new problem. The current episode started yesterday. The problem is unchanged. He describes the hematuria as gross hematuria.  The hematuria occurs throughout his entire urinary oriented to person, place, and time. He appears well-developed and well-nourished. Cardiovascular: Normal rate, regular rhythm, normal heart sounds and intact distal pulses. Pulmonary/Chest: Effort normal and breath sounds normal.   Abdominal: Soft.  Beverly Plants ([id number])  CT ABDOMEN KIDNEY STONE (NO IV) EM ([id number])       [id number]

## 2019-09-16 NOTE — TELEPHONE ENCOUNTER
Patient called stating he seen blood in his urine. Is not sure if he should wait for his physical appt on 10/9 or come in sooner. Patient was transferred to the Nurse Triage ext.

## 2019-09-20 ENCOUNTER — OFFICE VISIT (OUTPATIENT)
Dept: SURGERY | Facility: CLINIC | Age: 69
End: 2019-09-20
Payer: COMMERCIAL

## 2019-09-20 ENCOUNTER — TELEPHONE (OUTPATIENT)
Dept: SURGERY | Facility: CLINIC | Age: 69
End: 2019-09-20

## 2019-09-20 VITALS
TEMPERATURE: 98 F | DIASTOLIC BLOOD PRESSURE: 81 MMHG | WEIGHT: 197 LBS | BODY MASS INDEX: 24.49 KG/M2 | HEIGHT: 75 IN | HEART RATE: 59 BPM | SYSTOLIC BLOOD PRESSURE: 162 MMHG

## 2019-09-20 DIAGNOSIS — N20.0 RENAL CALCULUS, RIGHT: ICD-10-CM

## 2019-09-20 DIAGNOSIS — R31.0 GROSS HEMATURIA: Primary | ICD-10-CM

## 2019-09-20 LAB
APPEARANCE: CLEAR
BACTERIA UR QL AUTO: NEGATIVE /HPF
BILIRUB UR QL: NEGATIVE
COLOR UR: YELLOW
GLUCOSE UR-MCNC: NEGATIVE MG/DL
KETONES UR-MCNC: NEGATIVE MG/DL
LEUKOCYTE ESTERASE UR QL STRIP.AUTO: NEGATIVE
MULTISTIX LOT#: NORMAL NUMERIC
NITRITE UR QL STRIP.AUTO: NEGATIVE
PH UR: 6 [PH] (ref 5–8)
PH, URINE: 6 (ref 4.5–8)
PROT UR-MCNC: 30 MG/DL
RBC #/AREA URNS AUTO: 100 /HPF
SP GR UR STRIP: 1.02 (ref 1–1.03)
SPECIFIC GRAVITY: 1.02 (ref 1–1.03)
UROBILINOGEN UR STRIP-ACNC: <2
UROBILINOGEN,SEMI-QN: 0.2 MG/DL (ref 0–1.9)
WBC #/AREA URNS AUTO: 3 /HPF

## 2019-09-20 PROCEDURE — 81003 URINALYSIS AUTO W/O SCOPE: CPT | Performed by: UROLOGY

## 2019-09-20 PROCEDURE — 99243 OFF/OP CNSLTJ NEW/EST LOW 30: CPT | Performed by: UROLOGY

## 2019-09-20 PROCEDURE — 51798 US URINE CAPACITY MEASURE: CPT | Performed by: UROLOGY

## 2019-09-20 PROCEDURE — 52000 CYSTOURETHROSCOPY: CPT | Performed by: UROLOGY

## 2019-09-20 RX ORDER — CIPROFLOXACIN 500 MG/1
500 TABLET, FILM COATED ORAL ONCE
OUTPATIENT
Start: 2019-09-20 | End: 2019-09-20

## 2019-09-20 NOTE — TELEPHONE ENCOUNTER
I was asked by my supervisor to check with pt's ins if a PA is needed for an office cystoscopy for gross hematuria.  I called pt's [employer]/BS [facility] emp sang and s/w Kyra Osorio and gave her codes and demos and she informed that no PA was needed as long as it is don

## 2019-09-20 NOTE — PROGRESS NOTES
Rooming Clinician:     Jamila Marshall is a 77year old male. Patient presents with:  Hematuria: Patient is a 77year old male referred by Dr. Tobias Hernandez for gross Hematuria. Onset 9/15/19. Scrotal: Patient c/o small mass right scrotal area.  Patient sta by mouth 2 (two) times daily. Disp:  Rfl:    Multiple Vitamin (MULTI-VITAMIN DAILY) Oral Tab Take  by mouth. Disp:  Rfl:    aspirin 81 MG Oral Tab Take 1 tablet by mouth daily.  Disp: 30 tablet Rfl: 0     No Known Allergies   Past Medical History:   Diagn flexible cystoscope was then introduced into the bladder. Examination of the distal urethra was normal. The prostate was slightly enlarged but not obstructing. Examination of the bladder showed normal urothelium.  Musculature was normal. The ureteral offic diverticulitis. MESENTERY:   No mass or hernia. PELVIS:             No enlarged mass or adenopathy. The prostate is markedly enlarged. BONES:             There is degenerative disease of the thoracic and lumbar spine.  Degenerative changes are seen w trauma, kidney hematoma, infection, surgical pain, pain with passing the fragmented stones, possible need for repeat ESWL, possible stent placement, complications from anesthesia and rarely death.   In addition, the possible relationship between ESWL's and

## 2019-09-20 NOTE — PATIENT INSTRUCTIONS
On behalf of myself and care team, I would like to thank you for entrusting us with your care today. It is our goal to provide you and your family with excellent care.   Please note that you may receive a survey in the mail; any feedback you have for this second lithotripsy or another procedure.   Possible risks and complications  · Infection  · Bleeding in the kidney  · Bruising of the kidney or skin  · Blockage (obstruction) of the ureter  · Failure to break up the stone (other procedures may be needed)  P

## 2019-09-23 ENCOUNTER — TELEPHONE (OUTPATIENT)
Dept: SURGERY | Facility: CLINIC | Age: 69
End: 2019-09-23

## 2019-09-23 ENCOUNTER — TELEPHONE (OUTPATIENT)
Dept: FAMILY MEDICINE CLINIC | Facility: CLINIC | Age: 69
End: 2019-09-23

## 2019-09-23 NOTE — TELEPHONE ENCOUNTER
Tried to reach patient on cell/home. LMTCB. Did not leave a detailed message, need to schedule ESWL.

## 2019-09-23 NOTE — TELEPHONE ENCOUNTER
Pt would like to speak with you in regards to the results from the urologist, Dr. Linda Wilcox and would like to know any recommendations. Please call pt back. Thank you.

## 2019-09-23 NOTE — TELEPHONE ENCOUNTER
Contacted patient. He has concerns about possible complications from lithotripsy. He is generally disinclined to medical interventions. We discussed risks of watchful waiting versus lithotripsy. Also concerns of microscopic hematuria potentially inappropriately attributed to nephrolithiasis. Shannon Hubbard He wishes to defer lithotripsy at this time and we will recheck urinalysis routine physical in 2 weeks. Gopal and thank you for seeing him.

## 2019-09-23 NOTE — TELEPHONE ENCOUNTER
----- Message from Jasmyne Lopez MD sent at 9/22/2019 12:38 PM CDT -----  Your recent microscopic examination of the urine shows red blood cells and is abnormal.  Recommend schedule cystoscopy, retrograde pyelograms and right ESWL as we discussed. [facility]

## 2019-09-24 NOTE — TELEPHONE ENCOUNTER
Spoke with patient and reviewed urinalysis results and Dr Cathy Peng recommendation. Patient states he spoke with his primary and has a physical scheduled with her. He is leaning towards not having the procedure done at this time.   I stressed the importan

## 2019-09-26 ENCOUNTER — HOSPITAL ENCOUNTER (OUTPATIENT)
Dept: ULTRASOUND IMAGING | Facility: HOSPITAL | Age: 69
Discharge: HOME | End: 2019-09-26
Attending: FAMILY MEDICINE
Payer: COMMERCIAL

## 2019-09-26 DIAGNOSIS — N50.89 SCROTAL MASS: ICD-10-CM

## 2019-09-26 PROCEDURE — 93975 VASCULAR STUDY: CPT | Performed by: FAMILY MEDICINE

## 2019-09-26 PROCEDURE — 76870 US EXAM SCROTUM: CPT | Performed by: FAMILY MEDICINE

## 2019-10-09 ENCOUNTER — APPOINTMENT (OUTPATIENT)
Dept: LAB | Age: 69
End: 2019-10-09
Attending: FAMILY MEDICINE
Payer: COMMERCIAL

## 2019-10-09 ENCOUNTER — OFFICE VISIT (OUTPATIENT)
Dept: FAMILY MEDICINE CLINIC | Facility: CLINIC | Age: 69
End: 2019-10-09
Payer: COMMERCIAL

## 2019-10-09 VITALS
WEIGHT: 200.38 LBS | HEIGHT: 75 IN | HEART RATE: 58 BPM | BODY MASS INDEX: 24.91 KG/M2 | TEMPERATURE: 99 F | DIASTOLIC BLOOD PRESSURE: 82 MMHG | SYSTOLIC BLOOD PRESSURE: 134 MMHG

## 2019-10-09 DIAGNOSIS — Z00.00 ROUTINE PHYSICAL EXAMINATION: ICD-10-CM

## 2019-10-09 DIAGNOSIS — R03.0 ELEVATED BLOOD-PRESSURE READING WITHOUT DIAGNOSIS OF HYPERTENSION: ICD-10-CM

## 2019-10-09 DIAGNOSIS — M79.671 RIGHT FOOT PAIN: ICD-10-CM

## 2019-10-09 DIAGNOSIS — Z00.00 ROUTINE PHYSICAL EXAMINATION: Primary | ICD-10-CM

## 2019-10-09 DIAGNOSIS — R31.9 HEMATURIA, UNSPECIFIED TYPE: ICD-10-CM

## 2019-10-09 DIAGNOSIS — H53.9 VISION CHANGES: ICD-10-CM

## 2019-10-09 DIAGNOSIS — K63.5 POLYP OF COLON, UNSPECIFIED PART OF COLON, UNSPECIFIED TYPE: ICD-10-CM

## 2019-10-09 DIAGNOSIS — D12.6 ADENOMATOUS POLYP OF COLON, UNSPECIFIED PART OF COLON: ICD-10-CM

## 2019-10-09 PROCEDURE — 80061 LIPID PANEL: CPT

## 2019-10-09 PROCEDURE — 36415 COLL VENOUS BLD VENIPUNCTURE: CPT

## 2019-10-09 PROCEDURE — 99397 PER PM REEVAL EST PAT 65+ YR: CPT | Performed by: FAMILY MEDICINE

## 2019-10-09 PROCEDURE — 81001 URINALYSIS AUTO W/SCOPE: CPT

## 2019-10-09 PROCEDURE — 84550 ASSAY OF BLOOD/URIC ACID: CPT

## 2019-10-09 NOTE — PROGRESS NOTES
HPI:    Patient ID: Mariajose Wright is a 77year old male. Toe Pain     Eye Problem          Review of Systems   Constitutional: Negative. Respiratory: Negative. Cardiovascular: Negative. Gastrointestinal: Negative.     Musculoskeletal:        Rig heard.  Pulmonary/Chest: Effort normal and breath sounds normal.   Abdominal: Soft. Bowel sounds are normal.   Musculoskeletal:   Right foot with red discoloration, mild swelling. Tenderness near MTP. Lymphadenopathy:     He has no cervical adenopathy. [facility]) 50 MCG/0.5ML Intramuscular Recon Susp 1 each 0     Sig: Inject 1 Dose into the muscle one time for 1 dose.        Imaging & Referrals:  PNEUMOCOCCAL IMM (PNEUMOVAX)  OPHTHALMOLOGY - INTERNAL  EVALUATE & TREAT, GASTRO (INTERNAL)  PODIATRY - INTERN

## 2019-10-23 ENCOUNTER — HOSPITAL ENCOUNTER (OUTPATIENT)
Dept: GENERAL RADIOLOGY | Facility: HOSPITAL | Age: 69
Discharge: HOME | End: 2019-10-23
Attending: PODIATRIST
Payer: COMMERCIAL

## 2019-10-23 ENCOUNTER — OFFICE VISIT (OUTPATIENT)
Dept: PODIATRY CLINIC | Facility: CLINIC | Age: 69
End: 2019-10-23
Payer: COMMERCIAL

## 2019-10-23 DIAGNOSIS — M19.90 ARTHRITIS: ICD-10-CM

## 2019-10-23 DIAGNOSIS — M20.41 HAMMER TOE OF RIGHT FOOT: Primary | ICD-10-CM

## 2019-10-23 DIAGNOSIS — M20.41 HAMMER TOE OF RIGHT FOOT: ICD-10-CM

## 2019-10-23 PROCEDURE — 99203 OFFICE O/P NEW LOW 30 MIN: CPT | Performed by: PODIATRIST

## 2019-10-23 PROCEDURE — 73630 X-RAY EXAM OF FOOT: CPT | Performed by: PODIATRIST

## 2019-10-24 NOTE — PROGRESS NOTES
HPI:    Patient ID: Landon Hickman is a 77year old male. This pleasant 79-year-old male presents as a new patient to me on referral from [address] [place] [address]. Patient's concern today is primarily with his right foot.   He has noticed some change in the position of h edematous as compared to the left. There is a slight of induration color as well there is no erythema there is no increase in local temperature.   All of the lesser toes of both feet are contracted but on the right foot they are irreducible at the [employer]

## 2019-10-25 ENCOUNTER — OFFICE VISIT (OUTPATIENT)
Dept: RHEUMATOLOGY | Facility: CLINIC | Age: 69
End: 2019-10-25
Payer: COMMERCIAL

## 2019-10-25 ENCOUNTER — APPOINTMENT (OUTPATIENT)
Dept: LAB | Age: 69
End: 2019-10-25
Attending: INTERNAL MEDICINE
Payer: COMMERCIAL

## 2019-10-25 ENCOUNTER — TELEPHONE (OUTPATIENT)
Dept: RHEUMATOLOGY | Facility: CLINIC | Age: 69
End: 2019-10-25

## 2019-10-25 VITALS
HEART RATE: 55 BPM | SYSTOLIC BLOOD PRESSURE: 153 MMHG | HEIGHT: 75 IN | WEIGHT: 201.81 LBS | DIASTOLIC BLOOD PRESSURE: 88 MMHG | BODY MASS INDEX: 25.09 KG/M2

## 2019-10-25 DIAGNOSIS — M79.89 SWELLING OF RIGHT FOOT: ICD-10-CM

## 2019-10-25 DIAGNOSIS — M79.671 RIGHT FOOT PAIN: ICD-10-CM

## 2019-10-25 DIAGNOSIS — M45.9 ANKYLOSING SPONDYLITIS, UNSPECIFIED SITE OF SPINE (HCC): Primary | ICD-10-CM

## 2019-10-25 PROCEDURE — 86431 RHEUMATOID FACTOR QUANT: CPT

## 2019-10-25 PROCEDURE — 86140 C-REACTIVE PROTEIN: CPT

## 2019-10-25 PROCEDURE — 85652 RBC SED RATE AUTOMATED: CPT

## 2019-10-25 PROCEDURE — 36415 COLL VENOUS BLD VENIPUNCTURE: CPT

## 2019-10-25 PROCEDURE — 99214 OFFICE O/P EST MOD 30 MIN: CPT | Performed by: INTERNAL MEDICINE

## 2019-10-25 PROCEDURE — 86200 CCP ANTIBODY: CPT

## 2019-10-25 RX ORDER — PREDNISONE 10 MG/1
TABLET ORAL
Qty: 42 TABLET | Refills: 0 | Status: SHIPPED | OUTPATIENT
Start: 2019-10-25 | End: 2019-11-08

## 2019-10-25 NOTE — TELEPHONE ENCOUNTER
Please obtain PA for MRI of right foot if needed per [facility]. Thanks.

## 2019-10-28 NOTE — TELEPHONE ENCOUNTER
PA approved from 10/28/19 to 11/26/19 #[id number]. Left message for pt MRI approved, schedule exam, and complete by 11/26/19. Order mailed to pt with directions to complete exam before 11/26/19. Call office with questions.

## 2019-10-29 ENCOUNTER — HOSPITAL ENCOUNTER (OUTPATIENT)
Dept: MRI IMAGING | Age: 69
Discharge: HOME | End: 2019-10-29
Attending: INTERNAL MEDICINE
Payer: COMMERCIAL

## 2019-10-29 DIAGNOSIS — M79.671 RIGHT FOOT PAIN: ICD-10-CM

## 2019-10-29 DIAGNOSIS — M45.9 ANKYLOSING SPONDYLITIS, UNSPECIFIED SITE OF SPINE (HCC): ICD-10-CM

## 2019-10-29 DIAGNOSIS — M79.89 SWELLING OF RIGHT FOOT: ICD-10-CM

## 2019-10-29 PROCEDURE — A9575 INJ GADOTERATE MEGLUMI 0.1ML: HCPCS | Performed by: INTERNAL MEDICINE

## 2019-10-29 PROCEDURE — 82565 ASSAY OF CREATININE: CPT

## 2019-10-29 PROCEDURE — 73720 MRI LWR EXTREMITY W/O&W/DYE: CPT | Performed by: INTERNAL MEDICINE

## 2019-11-03 NOTE — PROGRESS NOTES
Toi Wade is a 59-year-old patient of Dr. Raul Restrepo. She last saw him November 28th, 2018, with ankylosing spondylitis/reactive arthritis. He is HLA-B27 positive. It seemed to resolve, but he had a recurrence of swelling in his left foot.   Dr. Jose Davenport treat with seronegative spondyloarthropathy or gout. Review of Systems   Constitutional: Negative for fatigue. Respiratory: Negative for shortness of breath. Cardiovascular: Negative for chest pain. Gastrointestinal: Negative for abdominal pain.    Skin methotrexate weekly, azathioprine, or an anti-TNF agent (such as Humira). He is very hesitant to start such medication. He enjoys his red wine in the evening, so azathioprine would be a much better choice than methotrexate.   An agent such as Humira wou

## 2019-11-08 ENCOUNTER — OFFICE VISIT (OUTPATIENT)
Dept: RHEUMATOLOGY | Facility: CLINIC | Age: 69
End: 2019-11-08
Payer: COMMERCIAL

## 2019-11-08 VITALS
WEIGHT: 201.63 LBS | HEIGHT: 75 IN | DIASTOLIC BLOOD PRESSURE: 83 MMHG | SYSTOLIC BLOOD PRESSURE: 147 MMHG | BODY MASS INDEX: 25.07 KG/M2 | HEART RATE: 60 BPM

## 2019-11-08 DIAGNOSIS — M47.819 SERONEGATIVE SPONDYLOARTHROPATHY: Primary | ICD-10-CM

## 2019-11-08 PROBLEM — Z87.39 HISTORY OF ANKYLOSING SPONDYLITIS: Status: ACTIVE | Noted: 2019-11-08

## 2019-11-08 PROBLEM — M45.9 ANKYLOSING SPONDYLITIS (HCC): Status: RESOLVED | Noted: 2017-01-10 | Resolved: 2019-11-08

## 2019-11-08 PROCEDURE — 99213 OFFICE O/P EST LOW 20 MIN: CPT | Performed by: INTERNAL MEDICINE

## 2019-11-08 RX ORDER — PREDNISONE 1 MG/1
TABLET ORAL
Qty: 100 TABLET | Refills: 1 | Status: SHIPPED | OUTPATIENT
Start: 2019-11-08 | End: 2019-12-12 | Stop reason: ALTCHOICE

## 2019-12-12 ENCOUNTER — OFFICE VISIT (OUTPATIENT)
Dept: FAMILY MEDICINE CLINIC | Facility: CLINIC | Age: 69
End: 2019-12-12
Payer: COMMERCIAL

## 2019-12-12 VITALS
TEMPERATURE: 97 F | HEIGHT: 75 IN | DIASTOLIC BLOOD PRESSURE: 72 MMHG | SYSTOLIC BLOOD PRESSURE: 130 MMHG | BODY MASS INDEX: 25.12 KG/M2 | RESPIRATION RATE: 16 BRPM | HEART RATE: 58 BPM | OXYGEN SATURATION: 98 % | WEIGHT: 202 LBS

## 2019-12-12 DIAGNOSIS — B34.9 ACUTE VIRAL SYNDROME: Primary | ICD-10-CM

## 2019-12-12 DIAGNOSIS — R68.89 FLU-LIKE SYMPTOMS: ICD-10-CM

## 2019-12-12 PROCEDURE — 87502 INFLUENZA DNA AMP PROBE: CPT | Performed by: PHYSICIAN ASSISTANT

## 2019-12-12 PROCEDURE — 99203 OFFICE O/P NEW LOW 30 MIN: CPT | Performed by: PHYSICIAN ASSISTANT

## 2019-12-12 NOTE — PATIENT INSTRUCTIONS
Viral Syndrome (Adult)  A viral illness may cause a number of symptoms such as fever. Other symptoms depend on the part of the body that the virus affects.  If it settles in your nose, throat, and lungs, it may cause cough, sore throat, congestion, runny · Your appetite may be poor, so a light diet is fine. Avoid dehydration by drinking 8 to 12, 8-ounce glasses of fluids each day.  This may include water; orange juice; lemonade; apple, grape, and cranberry juice; clear fruit drinks; electrolyte replacement © 3911-9831 [employer] [address] [facility] [place] [facility] [place]. All rights reserved. This information is not intended as a substitute for professional medical care. Always follow your healthcare professional's instructions.

## 2019-12-12 NOTE — PROGRESS NOTES
CHIEF COMPLAINT:   Patient presents with:  Flu: Tuesday night felt PND, started Zinc, Emergen-C, Elderberry, +chills/achy, took temp 101.5, mild nasal/sinus congestion using Afrin BID since yesterday, feeling better this morning, mild aches      HPI:   Ayaz Fennel • naproxen 500 MG Oral Tab Take 1 tablet (500 mg total) by mouth 2 (two) times daily with meals.  60 tablet 0      Past Medical History:   Diagnosis Date   • Glaucoma       Past Surgical History:   Procedure Laterality Date   • APPENDECTOMY  2007   • Lian Knight ASSESSMENT AND PLAN:   Venkat Gauthier is a 77year old male who presents with upper respiratory symptoms that are consistent with    ASSESSMENT:   Acute viral syndrome  (primary encounter diagnosis)  Flu-like symptoms    PLAN: Negative flu.  Discussed · You may use over-the-counter acetaminophen or ibuprofen for fever, muscle aching, and headache, unless another medicine was prescribed for this.  If you have chronic liver or kidney disease or ever had a stomach ulcer or gastrointestinal bleeding, talk wi · Frequent diarrhea (more than 5 times a day); blood (red or black color) or mucus in diarrhea  · Feeling weak, dizzy, or like you are going to faint  · Extreme thirst  · Fever of 100.4°F (38°C) or higher, or as directed by your healthcare provider  Date L

## 2019-12-19 ENCOUNTER — TELEPHONE (OUTPATIENT)
Dept: FAMILY MEDICINE CLINIC | Facility: CLINIC | Age: 69
End: 2019-12-19

## 2019-12-19 RX ORDER — AMOXICILLIN AND CLAVULANATE POTASSIUM 875; 125 MG/1; MG/1
1 TABLET, FILM COATED ORAL 2 TIMES DAILY
Qty: 20 TABLET | Refills: 0 | Status: SHIPPED | OUTPATIENT
Start: 2019-12-19 | End: 2019-12-29

## 2019-12-19 NOTE — TELEPHONE ENCOUNTER
Patient calling and states he have a fever, running nose, blowing blood out of nose,  and cough was at immediate care last week       Please advise  Transfer to triage

## 2019-12-19 NOTE — TELEPHONE ENCOUNTER
Pt requesting ABX, was seen at [facility] 12/12/19 Dx viral- now feels worse h/a, post nasal drip causing cough-worse at night ,sinus pain/pressure, pian upper teeth,yellow nasal discharge,sometimes bloody, have used netti pot, nasal sprays

## 2019-12-19 NOTE — TELEPHONE ENCOUNTER
Called patient, informed him of Dr. Ishmael Alejandra message below and plan of care. Also reviewed antibiotic instructions with patient. Patient verbalized understanding and agreed to plan of care.

## 2020-02-12 ENCOUNTER — OFFICE VISIT (OUTPATIENT)
Dept: RHEUMATOLOGY | Facility: CLINIC | Age: 70
End: 2020-02-12
Payer: COMMERCIAL

## 2020-02-12 VITALS
HEART RATE: 71 BPM | SYSTOLIC BLOOD PRESSURE: 145 MMHG | HEIGHT: 75 IN | BODY MASS INDEX: 24.99 KG/M2 | WEIGHT: 201 LBS | RESPIRATION RATE: 16 BRPM | DIASTOLIC BLOOD PRESSURE: 77 MMHG

## 2020-02-12 DIAGNOSIS — M47.819 SERONEGATIVE SPONDYLOARTHROPATHY: Primary | ICD-10-CM

## 2020-02-12 DIAGNOSIS — Z51.81 THERAPEUTIC DRUG MONITORING: ICD-10-CM

## 2020-02-12 PROCEDURE — 99214 OFFICE O/P EST MOD 30 MIN: CPT | Performed by: INTERNAL MEDICINE

## 2020-02-12 RX ORDER — NAPROXEN 500 MG/1
500 TABLET ORAL 2 TIMES DAILY WITH MEALS
Qty: 60 TABLET | Refills: 0 | Status: SHIPPED | OUTPATIENT
Start: 2020-02-12 | End: 2020-11-14

## 2020-02-12 RX ORDER — CYCLOBENZAPRINE HCL 5 MG
TABLET ORAL
Qty: 60 TABLET | Refills: 0 | Status: SHIPPED | OUTPATIENT
Start: 2020-02-12 | End: 2020-11-14

## 2020-02-12 NOTE — PROGRESS NOTES
Venkat Gauthier is a 77year old male who presents for Patient presents with: Follow - Up: Seronegative spondyloarthropathy   Foot Pain: left  Medication Follow-Up  . HPI:     I had the pleasure of seeing Venkat Gauthier on 12/19/2016 for evaluation.      He it a bit. After he got back. He noticed more pain in his hisp in the care. Then his left knee started hurting. He has an injury of his left knee after slipping about 4 years ago. It has recovered. He has slipped  Twice since but it's recovrered.      Ab 6 months he feesl he is less sharp and modd swings. Asking about prednisone effects. 5/2016 - he had albuterol. 11/2/2018  He's been on turmeric and helping him. He had swellign in his left foot stopped - this was his initial complaint.    He ende suggested to be put on humira or mtx. The prednisone helped his pain and swelling in his right foot. He then saw opthalmolgoist and had high eye pressures and was put on anti glaucoma meds. So he doesn't want to cont. Prednisone.  He was going to take tablet by mouth daily. • vitamin E 400 UNITS Oral Cap Take 400 Units by mouth. • Saw Palmetto, Serenoa repens, 450 MG Oral Cap Take 450 mg by mouth 2 (two) times daily. • Multiple Vitamin (MULTI-VITAMIN DAILY) Oral Tab Take by mouth.  CENTRU chest pain, no heart disease  RS: No SOB, no Cough, No Pleurtic pain, occl gasps at night -   GI: No nausea, no vomiiting, no abominal pain, no hx of ulcer, no gastritis, no heartburn, no dyshpagia, no BRBPR or melena  : has a kdiney stone noted since 20 22-32 mmol/L 27   BUN      8-20 mg/dL 12   CREATININE      0.50-1.50 mg/dL 0.82   CALCIUM      8.5-10.5 mg/dL 9.4   ALT (SGPT)      17-63 U/L 29   AST (SGOT)      15-41 U/L 25   ALKALINE PHOSPHATASE       U/L 66   Total Bilirubin      0.3-1.2 mg/dL 0 ANTIBODY      Nonreactive  Nonreactive    JHOANA SCREEN      Negative Negative     C-CITRULLINATED PEPTIDE IGG AB      0.0-6.9 U/mL 0.9     HLA-B27      Negative Positive (A)     RHEUMATOID FACTOR      <11 IU/mL <5       Component      Latest Ref [facility] 2/23/201  ID       [id number]     ISTAT CREATININE      0.70 - 1.30 mg/dL  0.90    eGFR AFRICAN AMERICAN      >=60  101    eGFR NON-AFR.  AMERICAN      >=60  87    SED RATE      0 - 20 mm/Hr   15   C-REACTIVE PROTEIN      <0.30 mg/dL   0.77 (H)   RHEUMATOID F Consider dedicated     completion chest CT for further evaluation. 4. Prostatomegaly with chronic outlet obstruction. 5. Lesser incidental findings as above. 11/2/2018 si joint xray   CONCLUSION:   1. Mild DJD. 2. No evidence of joint fusion.     11/2 flexeril 10mg a tnight and cont.  Naproxen 500mg bid prn -    - gets interimttently -  elevated crp of 2.2  He's not able to take prednisoen long term b/c of his glaucoma -    si joint xray  -xray shows midl oa - left sided brigdingl - no mri evdiecne of AS

## 2020-02-12 NOTE — PATIENT INSTRUCTIONS
1. Cont naproxen 500mg twice a day as needed  2. Flexeril 5mg -10mg at night - as needed  3. Monitor right foot -   4. Return to clinic in 6months.

## 2020-08-14 ENCOUNTER — OFFICE VISIT (OUTPATIENT)
Dept: RHEUMATOLOGY | Facility: CLINIC | Age: 70
End: 2020-08-14
Payer: COMMERCIAL

## 2020-08-14 VITALS
SYSTOLIC BLOOD PRESSURE: 173 MMHG | BODY MASS INDEX: 25.36 KG/M2 | WEIGHT: 204 LBS | HEIGHT: 75 IN | RESPIRATION RATE: 16 BRPM | HEART RATE: 56 BPM | DIASTOLIC BLOOD PRESSURE: 94 MMHG

## 2020-08-14 DIAGNOSIS — M45.9 ANKYLOSING SPONDYLITIS, UNSPECIFIED SITE OF SPINE (HCC): Primary | ICD-10-CM

## 2020-08-14 PROCEDURE — 99214 OFFICE O/P EST MOD 30 MIN: CPT | Performed by: INTERNAL MEDICINE

## 2020-08-14 PROCEDURE — 3008F BODY MASS INDEX DOCD: CPT | Performed by: INTERNAL MEDICINE

## 2020-08-14 PROCEDURE — 3077F SYST BP >= 140 MM HG: CPT | Performed by: INTERNAL MEDICINE

## 2020-08-14 PROCEDURE — 3080F DIAST BP >= 90 MM HG: CPT | Performed by: INTERNAL MEDICINE

## 2020-08-14 NOTE — PATIENT INSTRUCTIONS
1. Cont naproxen 500mg twice a day as needed  2. Flexeril 5mg -10mg at night - as needed  3. Can check yearly sed rate and crp   4. Return to clinic in 6-12 months. 5. Cont.  turmeric

## 2020-08-14 NOTE — PROGRESS NOTES
Stephon Morales is a 72year old male who presents for Patient presents with: Follow - Up: Seronegative spondyloarthropathy   Medication Follow-Up  . HPI:     I had the pleasure of seeing Stephon Morales on 12/19/2016 for evaluation.      He's a pleasant 66 After he got back. He noticed more pain in his hisp in the care. Then his left knee started hurting. He has an injury of his left knee after slipping about 4 years ago. It has recovered. He has slipped  Twice since but it's recovrered.      About 1 [place] he feesl he is less sharp and modd swings. Asking about prednisone effects. 5/2016 - he had albuterol. 11/2/2018  He's been on turmeric and helping him. He had swellign in his left foot stopped - this was his initial complaint.    He ended up tape put on humira or mtx. The prednisone helped his pain and swelling in his right foot. He then saw opthalmolgoist and had high eye pressures and was put on anti glaucoma meds. So he doesn't want to cont. Prednisone.  He was going to take it but since th naproxen 500 MG Oral Tab Take 1 tablet (500 mg total) by mouth 2 (two) times daily with meals.  60 tablet 0   • cyclobenzaprine 5 MG Oral Tab 5-10mg at night 60 tablet 0   • Latanoprostene Bunod (VYZULTA) 0.024 % Ophthalmic Solution Apply 1 drop to eye thomas - neck pain -   Other brother cancer passed away      Social History:  Social History    Tobacco Use      Smoking status: Former Smoker        Packs/day: 0.00      Smokeless tobacco: Former User    Alcohol use: Yes      Comment: wine, daily    Drug use: No bout  mobility in left -  decreased extension but able to move   Swollen but not tender in right calvicle   Right sided lower back -not  tender in right hip but not tender in si joints -    SLR negative b/l   Right 1-5ht mtps +1 chronic swelling, not tende Absolute      0.0-1.0 K/UL 0.6   Eosinophils Absolute      0.0-0.7 K/UL 0.1   Basophils Absolute      0.0-0.2 K/UL 0.0   SED RATE      0-20 mm/Hr 36 (H)   C-REACTIVE PROTEIN      0.0-0.9 mg/dL 3.4 (H)   LYME SCREEN IgG AND IgM (S)      Negative Negative No   WBC      4.0 - 11.0 K/UL 6.5   RBC      4.50 - 5.90 M/UL 4.55   Hemoglobin      13.5 - 17.5 g/dL 14.2   Hematocrit      41.0 - 52.0 % 41.7   MCV      80.0 - 100.0 fL 91.6   MCH      27.0 - 32.0 pg 31.1   MCHC      32.0 - 37.0 g/dl 34.0   RDW degenerative changes of the lumbar spine are present with     degenerative retrolistheses. Findings are most pronounced at L4-L5,     where there is moderate spinal canal stenosis and severe bilateral     neural foraminal encroachment.  Severe bilateral katlyn considerations include a seronegative spondyloarthropathy or gout. ASSESSMENT AND PLAN:   Re Smith is a 72year old male who presents for Patient presents with: Follow - Up: Seronegative spondyloarthropathy   Medication Follow-Up      1.  Seronegat information  through [address]

## 2020-10-06 ENCOUNTER — TELEPHONE (OUTPATIENT)
Dept: FAMILY MEDICINE CLINIC | Facility: CLINIC | Age: 70
End: 2020-10-06

## 2020-10-06 NOTE — TELEPHONE ENCOUNTER
Per patient he has an appointment on 11/17/2020 for physical and he would like to make it sure that he can have a pneumonia and flu shot on that time.

## 2020-11-13 NOTE — TELEPHONE ENCOUNTER
Cyclobenzaprine  Last filled: 2/12/2020 #60 tab with 0 refills     Naproxen  Last filled: 2/12/20 #60 tab with 0 refills   LOV: 8/14/2020   Future Appointments   Date Time Provider Matilde Castro   11/17/2020  9:30 AM Asia Liao MD [facility]

## 2020-11-14 RX ORDER — CYCLOBENZAPRINE HCL 5 MG
TABLET ORAL
Qty: 60 TABLET | Refills: 0 | Status: SHIPPED | OUTPATIENT
Start: 2020-11-14 | End: 2021-08-16

## 2020-11-14 RX ORDER — NAPROXEN 500 MG/1
500 TABLET ORAL 2 TIMES DAILY WITH MEALS
Qty: 60 TABLET | Refills: 0 | Status: SHIPPED | OUTPATIENT
Start: 2020-11-14

## 2020-11-17 ENCOUNTER — OFFICE VISIT (OUTPATIENT)
Dept: FAMILY MEDICINE CLINIC | Facility: CLINIC | Age: 70
End: 2020-11-17
Payer: COMMERCIAL

## 2020-11-17 VITALS
TEMPERATURE: 97 F | RESPIRATION RATE: 18 BRPM | HEART RATE: 69 BPM | BODY MASS INDEX: 25.02 KG/M2 | HEIGHT: 75 IN | WEIGHT: 201.19 LBS | DIASTOLIC BLOOD PRESSURE: 76 MMHG | SYSTOLIC BLOOD PRESSURE: 147 MMHG

## 2020-11-17 DIAGNOSIS — K64.9 HEMORRHOIDS, UNSPECIFIED HEMORRHOID TYPE: ICD-10-CM

## 2020-11-17 DIAGNOSIS — K63.5 POLYP OF COLON, UNSPECIFIED PART OF COLON, UNSPECIFIED TYPE: ICD-10-CM

## 2020-11-17 DIAGNOSIS — M47.819 SERONEGATIVE SPONDYLOARTHROPATHY: ICD-10-CM

## 2020-11-17 DIAGNOSIS — E78.5 HYPERLIPIDEMIA, UNSPECIFIED HYPERLIPIDEMIA TYPE: ICD-10-CM

## 2020-11-17 DIAGNOSIS — Z00.00 ROUTINE PHYSICAL EXAMINATION: Primary | ICD-10-CM

## 2020-11-17 DIAGNOSIS — R03.0 ELEVATED BLOOD-PRESSURE READING WITHOUT DIAGNOSIS OF HYPERTENSION: ICD-10-CM

## 2020-11-17 DIAGNOSIS — H61.22 IMPACTED CERUMEN OF LEFT EAR: ICD-10-CM

## 2020-11-17 PROCEDURE — 3077F SYST BP >= 140 MM HG: CPT | Performed by: FAMILY MEDICINE

## 2020-11-17 PROCEDURE — 3078F DIAST BP <80 MM HG: CPT | Performed by: FAMILY MEDICINE

## 2020-11-17 PROCEDURE — 90471 IMMUNIZATION ADMIN: CPT | Performed by: FAMILY MEDICINE

## 2020-11-17 PROCEDURE — 3008F BODY MASS INDEX DOCD: CPT | Performed by: FAMILY MEDICINE

## 2020-11-17 PROCEDURE — 36415 COLL VENOUS BLD VENIPUNCTURE: CPT | Performed by: FAMILY MEDICINE

## 2020-11-17 PROCEDURE — 69210 REMOVE IMPACTED EAR WAX UNI: CPT | Performed by: FAMILY MEDICINE

## 2020-11-17 PROCEDURE — 90662 IIV NO PRSV INCREASED AG IM: CPT | Performed by: FAMILY MEDICINE

## 2020-11-17 PROCEDURE — 90732 PPSV23 VACC 2 YRS+ SUBQ/IM: CPT | Performed by: FAMILY MEDICINE

## 2020-11-17 PROCEDURE — 90472 IMMUNIZATION ADMIN EACH ADD: CPT | Performed by: FAMILY MEDICINE

## 2020-11-17 PROCEDURE — 99072 ADDL SUPL MATRL&STAF TM PHE: CPT | Performed by: FAMILY MEDICINE

## 2020-11-17 PROCEDURE — 99397 PER PM REEVAL EST PAT 65+ YR: CPT | Performed by: FAMILY MEDICINE

## 2020-11-17 RX ORDER — TRAVOPROST OPHTHALMIC SOLUTION 0.04 MG/ML
1 SOLUTION OPHTHALMIC NIGHTLY
COMMUNITY
End: 2020-11-17 | Stop reason: ALTCHOICE

## 2020-11-17 RX ORDER — ACETAMINOPHEN 325 MG/1
325 TABLET ORAL EVERY 6 HOURS PRN
COMMUNITY

## 2020-11-17 NOTE — PROGRESS NOTES
HPI:    Patient ID: Mariajose Wright is a 72year old male. HPI    Review of Systems   Constitutional: Negative. Respiratory: Negative. Cardiovascular: Negative. Gastrointestinal: Negative. Skin: Negative. Neurological: Negative. sounds are normal.   Neurological: He is alert and oriented to person, place, and time. He has normal reflexes. Skin: Skin is warm and dry.               ASSESSMENT/PLAN:   Routine physical examination  (primary encounter diagnosis)–patient is , re

## 2020-11-17 NOTE — PROCEDURES
Cerumen removed left with irrigation and curette.  TMs visualized with otoscope, normal. Tolerated well

## 2020-12-08 ENCOUNTER — LAB ENCOUNTER (OUTPATIENT)
Dept: LAB | Age: 70
End: 2020-12-08
Attending: FAMILY MEDICINE
Payer: COMMERCIAL

## 2020-12-08 DIAGNOSIS — R79.89 ABNORMAL CBC: ICD-10-CM

## 2020-12-08 PROCEDURE — 36415 COLL VENOUS BLD VENIPUNCTURE: CPT

## 2020-12-08 PROCEDURE — 85025 COMPLETE CBC W/AUTO DIFF WBC: CPT

## 2021-01-20 ENCOUNTER — PATIENT MESSAGE (OUTPATIENT)
Dept: FAMILY MEDICINE CLINIC | Facility: CLINIC | Age: 71
End: 2021-01-20

## 2021-01-20 NOTE — TELEPHONE ENCOUNTER
From: Shawn Maldonado  To: Roberta Hansen MD  Sent: 1/20/2021 1:06 PM CST  Subject: Other    I would like to have my medical records updated to include the completion of the 2-series shingles vaccine. Thank you.

## 2021-03-05 ENCOUNTER — IMMUNIZATION (OUTPATIENT)
Dept: LAB | Age: 71
End: 2021-03-05

## 2021-03-05 DIAGNOSIS — Z23 NEED FOR VACCINATION: Primary | ICD-10-CM

## 2021-03-05 PROCEDURE — 91301 COVID-19 MODERNA VACCINE: CPT

## 2021-03-05 PROCEDURE — 0011A COVID-19 MODERNA VACCINE: CPT

## 2021-03-18 ENCOUNTER — PATIENT MESSAGE (OUTPATIENT)
Dept: FAMILY MEDICINE CLINIC | Facility: CLINIC | Age: 71
End: 2021-03-18

## 2021-03-18 NOTE — TELEPHONE ENCOUNTER
From: Linda Estrada  To: Roberta Garibay MD  Sent: 3/18/2021 11:55 AM CDT  Subject: Other    Hi Doctor Cady Garibay,    Just wanted to let you know, I received my first Marcheta Beer, vaccination in [facility] on Friday, 03/05/2021, and am scheduled for my s

## 2021-03-26 ENCOUNTER — OFFICE VISIT (OUTPATIENT)
Dept: FAMILY MEDICINE CLINIC | Facility: CLINIC | Age: 71
End: 2021-03-26
Payer: COMMERCIAL

## 2021-03-26 ENCOUNTER — NURSE TRIAGE (OUTPATIENT)
Dept: FAMILY MEDICINE CLINIC | Facility: CLINIC | Age: 71
End: 2021-03-26

## 2021-03-26 VITALS
HEART RATE: 71 BPM | DIASTOLIC BLOOD PRESSURE: 75 MMHG | HEIGHT: 75 IN | BODY MASS INDEX: 25.36 KG/M2 | WEIGHT: 204 LBS | SYSTOLIC BLOOD PRESSURE: 136 MMHG

## 2021-03-26 DIAGNOSIS — K11.20 SIALADENITIS: Primary | ICD-10-CM

## 2021-03-26 PROCEDURE — 3008F BODY MASS INDEX DOCD: CPT | Performed by: FAMILY MEDICINE

## 2021-03-26 PROCEDURE — 99213 OFFICE O/P EST LOW 20 MIN: CPT | Performed by: FAMILY MEDICINE

## 2021-03-26 PROCEDURE — 3078F DIAST BP <80 MM HG: CPT | Performed by: FAMILY MEDICINE

## 2021-03-26 PROCEDURE — 3075F SYST BP GE 130 - 139MM HG: CPT | Performed by: FAMILY MEDICINE

## 2021-03-26 NOTE — PROGRESS NOTES
HPI/Subjective:   Patient ID: Venkat Gauthier is a 80year old male.     HPI  Patient presents with:  Swollen Glands: RT side of swollen gland on neck, size of golf ball, size has gone down,    Dryness: dry mouth with scumb for 1 month,      History/Other: Glands: Right salivary gland is diffusely enlarged. Right Ear: Tympanic membrane normal.      Left Ear: Tympanic membrane normal.      Mouth/Throat:      Mouth: Mucous membranes are moist.      Pharynx: Oropharynx is clear.    Neck:        Comments: 3

## 2021-03-26 NOTE — TELEPHONE ENCOUNTER
Action Requested: Summary for Provider     []  Critical Lab, Recommendations Needed  [] Need Additional Advice  []   FYI    []   Need Orders  [] Need Medications Sent to Pharmacy  []  Other     SUMMARY:     Patient developed swollen lymph node underneath h

## 2021-03-31 ENCOUNTER — PATIENT MESSAGE (OUTPATIENT)
Dept: FAMILY MEDICINE CLINIC | Facility: CLINIC | Age: 71
End: 2021-03-31

## 2021-03-31 DIAGNOSIS — R22.1 NECK SWELLING: Primary | ICD-10-CM

## 2021-03-31 NOTE — TELEPHONE ENCOUNTER
From: Isaias Foley  To: Roberta Chan MD  Sent: 3/31/2021 12:28 PM CDT  Subject: Non-Urgent Medical Question    Hi. I had a swollen spot on my neck that came on suddenly. I saw Dr. Rosangela Valderrama, last week. At that time, it was about golf ball size.  Lori Gaspar

## 2021-04-02 ENCOUNTER — IMMUNIZATION (OUTPATIENT)
Dept: LAB | Age: 71
End: 2021-04-02
Attending: HOSPITALIST

## 2021-04-02 ENCOUNTER — OFFICE VISIT (OUTPATIENT)
Dept: OTOLARYNGOLOGY | Facility: CLINIC | Age: 71
End: 2021-04-02
Payer: COMMERCIAL

## 2021-04-02 VITALS
SYSTOLIC BLOOD PRESSURE: 150 MMHG | BODY MASS INDEX: 26 KG/M2 | WEIGHT: 204 LBS | HEART RATE: 62 BPM | TEMPERATURE: 97 F | DIASTOLIC BLOOD PRESSURE: 90 MMHG

## 2021-04-02 DIAGNOSIS — K11.20 SUBMANDIBULAR GLAND INFECTION: Primary | ICD-10-CM

## 2021-04-02 DIAGNOSIS — Z23 NEED FOR VACCINATION: Primary | ICD-10-CM

## 2021-04-02 PROCEDURE — 0012A COVID-19 MODERNA VACCINE: CPT

## 2021-04-02 PROCEDURE — 91301 COVID-19 MODERNA VACCINE: CPT

## 2021-04-02 PROCEDURE — 99243 OFF/OP CNSLTJ NEW/EST LOW 30: CPT | Performed by: OTOLARYNGOLOGY

## 2021-04-02 PROCEDURE — 3080F DIAST BP >= 90 MM HG: CPT | Performed by: OTOLARYNGOLOGY

## 2021-04-02 PROCEDURE — 3077F SYST BP >= 140 MM HG: CPT | Performed by: OTOLARYNGOLOGY

## 2021-04-02 RX ORDER — AMOXICILLIN 500 MG/1
500 CAPSULE ORAL 3 TIMES DAILY
Qty: 21 CAPSULE | Refills: 0 | Status: SHIPPED | OUTPATIENT
Start: 2021-04-02 | End: 2021-04-09

## 2021-04-02 NOTE — PROGRESS NOTES
John De Oliveira is a 80year old male. Patient presents with:  Lump: Swelling gland of right side of neck. Dryness of mouth x 5 weeks      HISTORY OF PRESENT ILLNESS  4/2/2021  Patient presents for evaluation of a neck mass .  This was noticed 1week ago by laura   Minutes of Exercise per Session:   Stress:       Feeling of Stress :   Social Connections:       Frequency of Communication with Friends and Family:       Frequency of Social Gatherings with Friends and Family:       Attends [employer] Services:       Act Overall appearance - Normal.   Psychiatric Normal Orientation - Oriented to time, place, person & situation. Appropriate mood and affect. Neck Exam  abnormal   visibly enlarged right submandibular gland.   Slightly tender but diffusely enlarged with no fo Take 1 cap every day. , Disp: , Rfl:   •  Misc Natural Products (GLUCOSAMINE CHONDROITIN MSM OR), Take by mouth daily.  Take 2 tabs daily, Disp: , Rfl:   •  NON FORMULARY, Zeaxanthin take one tab every day. , Disp: , Rfl:   •  Cholecalciferol (VITAMIN D3) 5 errors during dictation, discrepancies may still exist

## 2021-04-15 ENCOUNTER — PATIENT MESSAGE (OUTPATIENT)
Dept: FAMILY MEDICINE CLINIC | Facility: CLINIC | Age: 71
End: 2021-04-15

## 2021-04-15 NOTE — TELEPHONE ENCOUNTER
From: Frandy Smart  To: Roberta Hassan MD  Sent: 4/15/2021 2:50 PM CDT  Subject: Other    I have received my first and second Covid vaccines. I am attaching a copy of my card so that my medical record may be updated. Thank you.

## 2021-08-16 ENCOUNTER — OFFICE VISIT (OUTPATIENT)
Dept: RHEUMATOLOGY | Facility: CLINIC | Age: 71
End: 2021-08-16
Payer: COMMERCIAL

## 2021-08-16 VITALS
WEIGHT: 207.5 LBS | BODY MASS INDEX: 25.8 KG/M2 | RESPIRATION RATE: 16 BRPM | SYSTOLIC BLOOD PRESSURE: 147 MMHG | DIASTOLIC BLOOD PRESSURE: 85 MMHG | HEIGHT: 75 IN | HEART RATE: 61 BPM

## 2021-08-16 DIAGNOSIS — G89.29 CHRONIC NECK PAIN: ICD-10-CM

## 2021-08-16 DIAGNOSIS — M45.9 ANKYLOSING SPONDYLITIS, UNSPECIFIED SITE OF SPINE (HCC): Primary | ICD-10-CM

## 2021-08-16 DIAGNOSIS — M54.2 CHRONIC NECK PAIN: ICD-10-CM

## 2021-08-16 PROCEDURE — 3077F SYST BP >= 140 MM HG: CPT | Performed by: INTERNAL MEDICINE

## 2021-08-16 PROCEDURE — 99214 OFFICE O/P EST MOD 30 MIN: CPT | Performed by: INTERNAL MEDICINE

## 2021-08-16 PROCEDURE — 3079F DIAST BP 80-89 MM HG: CPT | Performed by: INTERNAL MEDICINE

## 2021-08-16 PROCEDURE — 3008F BODY MASS INDEX DOCD: CPT | Performed by: INTERNAL MEDICINE

## 2021-08-16 RX ORDER — MULTIVIT WITH MINERALS/LUTEIN
1000 TABLET ORAL DAILY
COMMUNITY

## 2021-08-16 RX ORDER — CYCLOBENZAPRINE HCL 5 MG
TABLET ORAL
Qty: 60 TABLET | Refills: 1 | Status: SHIPPED | OUTPATIENT
Start: 2021-08-16

## 2021-08-16 NOTE — PATIENT INSTRUCTIONS
1. Cont naproxen/aleve  500mg twice a day as needed  2. Flexeril 5mg -10mg at night - as needed  3. Return to clinic in 6-12 months. 4. Do more stretching. 5. Cont.  Turmeric/fish oild -

## 2021-08-16 NOTE — PROGRESS NOTES
Sherif Greer is a 80year old male who presents for Patient presents with: Ankylosing Spondylitis  Medication Follow-Up  . HPI:     I had the pleasure of seeing Sherif Greer on 12/19/2016 for evaluation.      He's a pleasant 73year old who has joint p noticed more pain in his hisp in the care. Then his left knee started hurting. He has an injury of his left knee after slipping about 4 years ago. It has recovered. He has slipped  Twice since but it's recovrered.      About 1 month before, his left 3rd t libby and beena ruvalcabas. Asking about prednisone effects. 5/2016 - he had albuterol. 11/2/2018  He's been on turmeric and helping him. He had swellign in his left foot stopped - this was his initial complaint.    He ended up tapering off the prednis The prednisone helped his pain and swelling in his right foot. He then saw opthalmolgoist and had high eye pressures and was put on anti glaucoma meds. So he doesn't want to cont. Prednisone.  He was going to take it but since the eye doctor visit, he feels fine. He doen'ts have much pain. He only notices when he over does it in the yard. He can get hip pain. He feels this is an old injury - he was on skipatrol .    He occl takes one of the naproxen when it hurts and he over dose it and occl the flex mouth daily. • vitamin E 400 UNITS Oral Cap Take 400 Units by mouth. • Saw Palmetto, Serenoa repens, 450 MG Oral Cap Take 450 mg by mouth 2 (two) times daily. • Multiple Vitamin (MULTI-VITAMIN DAILY) Oral Tab Take by mouth.  CENTRUM SILVER F No visual changes,   CVS: No chest pain, no heart disease  RS: No SOB, no Cough, No Pleurtic pain, occl gasps at night -   GI: No nausea, no vomiiting, no abominal pain, no hx of ulcer, no gastritis, no heartburn, no dyshpagia, no BRBPR or melena  : has 0. 8   TOTAL PROTEIN      5.9-8.4 g/dL 7.2   Albumin      3.5-4.8 g/dL 3.7   GLOBULIN, TOTAL      2.5-3.7 g/dL 3.5   A/G RATIO      1.0-2.0 1.1   ANION GAP      0-18 10   BUN/CREA RATIO      10.0-20.0 14.6   CALCULATED OSMOLALITY      275-295 mOsm/kg 289 C-REACTIVE PROTEIN      0.0-0.9 mg/dL <0.5   SED RATE      0-20 mm/Hr 9     Component      Latest Ref Rng & Units 11/2/2018   Glucose      70 - 99 mg/dL 82   Sodium      136 - 144 mmol/L 138   Potassium      3.3 - 5.1 mmol/L 3.9   Chloride      95 - 110 <15 IU/mL   <10   C-Citrullinated Peptide IgG AB      0.0 - 6.9 U/mL   0.8     Component      Latest Ref Rng & Units 12/8/2020 11/17/2020 11/17/2020           10:23 AM 10:23 AM   WBC      4.0 - 11.0 x10(3) uL 5.6  3.4 (L)   RBC      3.80 - 5.80 x10(6)uL Cholesterol Calc      <100 mg/dL  104 (H)    VLDL      0 - 30 mg/dL  14    NON HDL CHOL      <130 mg/dL  118      12/7/2016 - left foot - Normal examination.   12/8/2016 - ct chest       Numerous bilateral pulmonary nodules are redemonstrated and without si joint fusion. 11/2/2018 - lumbar xray   1. DJD lumbosacral spine. 10/23/2019 - right foot xray   CONCLUSION:  1. 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 2.           Plantar calcaneal enthesophyte.   3. now on c psine or t spine mri - and lumbar ju  -    d/w  about AS /seronegatie sponylarthroapthy in the past.   - d.w him about all treatments - he vickey rick it - maybs ssz better to start with it,   = turmeric   - advil 200mg a day -   He's not mariana

## 2021-09-20 ENCOUNTER — OFFICE VISIT (OUTPATIENT)
Dept: GASTROENTEROLOGY | Facility: CLINIC | Age: 71
End: 2021-09-20
Payer: COMMERCIAL

## 2021-09-20 ENCOUNTER — TELEPHONE (OUTPATIENT)
Dept: GASTROENTEROLOGY | Facility: CLINIC | Age: 71
End: 2021-09-20

## 2021-09-20 VITALS
HEART RATE: 67 BPM | BODY MASS INDEX: 25.84 KG/M2 | HEIGHT: 75 IN | DIASTOLIC BLOOD PRESSURE: 90 MMHG | SYSTOLIC BLOOD PRESSURE: 140 MMHG | WEIGHT: 207.81 LBS

## 2021-09-20 DIAGNOSIS — Z86.010 HISTORY OF ADENOMATOUS POLYP OF COLON: ICD-10-CM

## 2021-09-20 DIAGNOSIS — Z86.010 HISTORY OF COLON POLYPS: Primary | ICD-10-CM

## 2021-09-20 DIAGNOSIS — K62.5 RECTAL BLEEDING: Primary | ICD-10-CM

## 2021-09-20 PROCEDURE — 99203 OFFICE O/P NEW LOW 30 MIN: CPT | Performed by: INTERNAL MEDICINE

## 2021-09-20 PROCEDURE — 3008F BODY MASS INDEX DOCD: CPT | Performed by: INTERNAL MEDICINE

## 2021-09-20 PROCEDURE — 3080F DIAST BP >= 90 MM HG: CPT | Performed by: INTERNAL MEDICINE

## 2021-09-20 PROCEDURE — 3077F SYST BP >= 140 MM HG: CPT | Performed by: INTERNAL MEDICINE

## 2021-09-20 RX ORDER — POLYETHYLENE GLYCOL 3350, SODIUM SULFATE ANHYDROUS, SODIUM BICARBONATE, SODIUM CHLORIDE, POTASSIUM CHLORIDE 236; 22.74; 6.74; 5.86; 2.97 G/4L; G/4L; G/4L; G/4L; G/4L
4 POWDER, FOR SOLUTION ORAL ONCE
Qty: 1 EACH | Refills: 0 | Status: SHIPPED | OUTPATIENT
Start: 2021-09-20 | End: 2021-09-20

## 2021-09-20 NOTE — PROGRESS NOTES
HPI:    Patient ID: Roxanna Vora is a 80year old man with BMI 26, history of glaucoma, no other active medical problems who was referred in late 2014 by Dr. Matteo Calixto for his first screening colonoscopy examination.     Colonoscopy examination December 2014 Stevan Yeh MD     SEDATION:  Fentanyl 100 mcg and midazolam 7 mg given intravenously   individed doses before and during a prolonged procedure.      COLONOSCOPY PROCEDURE:  After the nature and risks of colonoscopy examination under   conscious over 8 mm removed from the cecum and transverse colon as above. 2.  Minimal left-sided   colonic diverticulosis. 3.  Internal hemorrhoids. RECOMMENDATIONS:  1. High fiber diet. 2.  Follow up above colon polyp pathology results.   3.  Repeat colono Take 1 tablet by mouth daily. 30 tablet 0     Allergies:No Known Allergies   PHYSICAL EXAM:   Physical Exam         ASSESSMENT/PLAN:   No diagnosis found.     Normal CBC 12/8/2020  Normal renal function, BMP, PSA 11/17/2020      Healthy 43-year-old man with defined types were placed in this encounter.       Meds This Visit:  Requested Prescriptions      No prescriptions requested or ordered in this encounter       Imaging & Referrals:  None     #[id number]

## 2021-09-20 NOTE — PATIENT INSTRUCTIONS
1. Konsyl powder laxative about 1 TBSP per day with diluted juice    2. GI schedulers –    Please schedule colonoscopy exam at [facility]    Body mass index is 25.97 kg/m².     MAC anesthesia     Golytely (PEG) 4L bowel pr

## 2021-09-20 NOTE — TELEPHONE ENCOUNTER
Scheduled for:  Colonoscopy 28[address]   Provider Name:  Dr. Yuniel Richardson  Date:  12/22/2021  Location:   [facility]   Sedation:  Mac   Time:  3:00 Pm(pt is aware that [facility] will call the day before to confirm arrival time)     Prep: Golytely  Prep instructions were given to

## 2021-10-05 ENCOUNTER — TELEPHONE (OUTPATIENT)
Dept: FAMILY MEDICINE CLINIC | Facility: CLINIC | Age: 71
End: 2021-10-05

## 2021-10-05 DIAGNOSIS — R59.0 CERVICAL ADENOPATHY: Primary | ICD-10-CM

## 2021-10-05 NOTE — TELEPHONE ENCOUNTER
Patient reports ongoing symptom of gland to right side of neck being swollen. He had appt 3/26 and they thought it was related to his salivary gland. He saw ENT 4/2 and was put on antibiotic.  He was having tooth pain and went to dentist who said his lymph

## 2021-10-05 NOTE — TELEPHONE ENCOUNTER
Spoke to patient and relayed Dr. De La Fuente Grief message below. Patient verbalized understanding and had no further questions.

## 2021-10-05 NOTE — TELEPHONE ENCOUNTER
Please let him know I recommend seeing either Dr. Tee Aranda or Dr. Pola Ochoa for further evaluation due to duration of symptoms.

## 2021-12-19 ENCOUNTER — LAB REQUISITION (OUTPATIENT)
Dept: SURGERY | Age: 71
End: 2021-12-19
Payer: COMMERCIAL

## 2021-12-19 DIAGNOSIS — Z01.818 PREOP EXAMINATION: ICD-10-CM

## 2021-12-22 ENCOUNTER — SURGERY CENTER DOCUMENTATION (OUTPATIENT)
Dept: SURGERY | Age: 71
End: 2021-12-22

## 2021-12-22 ENCOUNTER — LAB REQUISITION (OUTPATIENT)
Dept: SURGERY | Age: 71
End: 2021-12-22
Payer: COMMERCIAL

## 2021-12-22 DIAGNOSIS — Z86.010 HX OF COLONIC POLYPS: ICD-10-CM

## 2021-12-22 PROCEDURE — 88305 TISSUE EXAM BY PATHOLOGIST: CPT | Performed by: INTERNAL MEDICINE

## 2021-12-22 NOTE — PROCEDURES
[facility]      COLONOSCOPY PROCEDURE REPORT     DATE OF PROCEDURE:  12/22/2021     PCP: Anish Cordoba. Ashley Sharpe MD     PREOPERATIVE DIAGNOSIS: History sessile serrated adenoma colon polyp     POSTOPERATIVE DIAGNOSIS:  See impression.      Kwabena Arauz results. · Repeat colonoscopy examination in 5 years.   · No aspirin or NSAID medications for next 5 days to prevent bleeding

## 2022-01-17 ENCOUNTER — TELEPHONE (OUTPATIENT)
Dept: GASTROENTEROLOGY | Facility: CLINIC | Age: 72
End: 2022-01-17

## 2022-01-17 NOTE — TELEPHONE ENCOUNTER
Results letter mailed to patient per   Colon recall entered for repeat in 5 yrs,12/22/2026  Colon done in 12/22/2021  HM Updated and Patient Outreach was placed for Colon recall  Ewa Hicks MD  P Em Gi Clinical Staff  GI RNs - 1.  Pl

## 2022-02-01 ENCOUNTER — OFFICE VISIT (OUTPATIENT)
Dept: FAMILY MEDICINE CLINIC | Facility: CLINIC | Age: 72
End: 2022-02-01
Payer: COMMERCIAL

## 2022-02-01 VITALS
WEIGHT: 207.63 LBS | HEIGHT: 75 IN | DIASTOLIC BLOOD PRESSURE: 82 MMHG | SYSTOLIC BLOOD PRESSURE: 138 MMHG | BODY MASS INDEX: 25.82 KG/M2 | TEMPERATURE: 97 F | RESPIRATION RATE: 18 BRPM | HEART RATE: 72 BPM

## 2022-02-01 DIAGNOSIS — K11.5 SALIVARY DUCT STONE: ICD-10-CM

## 2022-02-01 DIAGNOSIS — I73.00 RAYNAUD'S PHENOMENON WITHOUT GANGRENE: ICD-10-CM

## 2022-02-01 DIAGNOSIS — K63.5 POLYP OF COLON, UNSPECIFIED PART OF COLON, UNSPECIFIED TYPE: ICD-10-CM

## 2022-02-01 DIAGNOSIS — Z00.00 ROUTINE PHYSICAL EXAMINATION: Primary | ICD-10-CM

## 2022-02-01 DIAGNOSIS — E78.5 HYPERLIPIDEMIA, UNSPECIFIED HYPERLIPIDEMIA TYPE: ICD-10-CM

## 2022-02-01 LAB
ALBUMIN SERPL-MCNC: 4 G/DL (ref 3.4–5)
ALBUMIN/GLOB SERPL: 1.1 {RATIO} (ref 1–2)
ALP LIVER SERPL-CCNC: 64 U/L
ALT SERPL-CCNC: 30 U/L
ANION GAP SERPL CALC-SCNC: 6 MMOL/L (ref 0–18)
AST SERPL-CCNC: 27 U/L (ref 15–37)
BILIRUB SERPL-MCNC: 0.6 MG/DL (ref 0.1–2)
BUN BLD-MCNC: 11 MG/DL (ref 7–18)
BUN/CREAT SERPL: 11.2 (ref 10–20)
CALCIUM BLD-MCNC: 9.1 MG/DL (ref 8.5–10.1)
CHLORIDE SERPL-SCNC: 110 MMOL/L (ref 98–112)
CHOLEST SERPL-MCNC: 218 MG/DL (ref ?–200)
CO2 SERPL-SCNC: 24 MMOL/L (ref 21–32)
COMPLEXED PSA SERPL-MCNC: 1 NG/ML (ref ?–4)
CREAT BLD-MCNC: 0.98 MG/DL
DEPRECATED RDW RBC AUTO: 49.9 FL (ref 35.1–46.3)
ERYTHROCYTE [DISTWIDTH] IN BLOOD BY AUTOMATED COUNT: 14.1 % (ref 11–15)
FASTING PATIENT LIPID ANSWER: YES
FASTING STATUS PATIENT QL REPORTED: YES
GLOBULIN PLAS-MCNC: 3.7 G/DL (ref 2.8–4.4)
GLUCOSE BLD-MCNC: 84 MG/DL (ref 70–99)
HCT VFR BLD AUTO: 46.8 %
HDLC SERPL-MCNC: 49 MG/DL (ref 40–59)
HGB BLD-MCNC: 15.5 G/DL
LDLC SERPL CALC-MCNC: 153 MG/DL (ref ?–100)
MCHC RBC AUTO-ENTMCNC: 33.1 G/DL (ref 31–37)
MCV RBC AUTO: 95.7 FL
NONHDLC SERPL-MCNC: 169 MG/DL (ref ?–130)
OSMOLALITY SERPL CALC.SUM OF ELEC: 289 MOSM/KG (ref 275–295)
PLATELET # BLD AUTO: 303 10(3)UL (ref 150–450)
POTASSIUM SERPL-SCNC: 4 MMOL/L (ref 3.5–5.1)
PROT SERPL-MCNC: 7.7 G/DL (ref 6.4–8.2)
RBC # BLD AUTO: 4.89 X10(6)UL
SODIUM SERPL-SCNC: 140 MMOL/L (ref 136–145)
TRIGL SERPL-MCNC: 89 MG/DL (ref 30–149)
VLDLC SERPL CALC-MCNC: 17 MG/DL (ref 0–30)

## 2022-02-01 PROCEDURE — 99397 PER PM REEVAL EST PAT 65+ YR: CPT | Performed by: FAMILY MEDICINE

## 2022-02-01 PROCEDURE — 3075F SYST BP GE 130 - 139MM HG: CPT | Performed by: FAMILY MEDICINE

## 2022-02-01 PROCEDURE — 3079F DIAST BP 80-89 MM HG: CPT | Performed by: FAMILY MEDICINE

## 2022-02-01 PROCEDURE — 3008F BODY MASS INDEX DOCD: CPT | Performed by: FAMILY MEDICINE

## 2022-04-19 ENCOUNTER — MED REC SCAN ONLY (OUTPATIENT)
Dept: FAMILY MEDICINE CLINIC | Facility: CLINIC | Age: 72
End: 2022-04-19

## 2022-04-28 ENCOUNTER — LAB ENCOUNTER (OUTPATIENT)
Dept: LAB | Age: 72
End: 2022-04-28
Attending: FAMILY MEDICINE
Payer: COMMERCIAL

## 2022-04-28 DIAGNOSIS — Z20.822 ENCOUNTER FOR SCREENING LABORATORY TESTING FOR COVID-19 VIRUS IN ASYMPTOMATIC PATIENT: ICD-10-CM

## 2022-04-29 LAB — SARS-COV-2 RNA RESP QL NAA+PROBE: NOT DETECTED

## 2022-08-17 ENCOUNTER — OFFICE VISIT (OUTPATIENT)
Dept: RHEUMATOLOGY | Facility: CLINIC | Age: 72
End: 2022-08-17
Payer: COMMERCIAL

## 2022-08-17 VITALS
BODY MASS INDEX: 26.27 KG/M2 | SYSTOLIC BLOOD PRESSURE: 158 MMHG | WEIGHT: 211.31 LBS | HEIGHT: 75 IN | HEART RATE: 62 BPM | DIASTOLIC BLOOD PRESSURE: 89 MMHG | RESPIRATION RATE: 16 BRPM

## 2022-08-17 DIAGNOSIS — G89.29 CHRONIC NECK PAIN: Primary | ICD-10-CM

## 2022-08-17 DIAGNOSIS — M54.2 CHRONIC NECK PAIN: Primary | ICD-10-CM

## 2022-08-17 DIAGNOSIS — M45.9 ANKYLOSING SPONDYLITIS, UNSPECIFIED SITE OF SPINE (HCC): ICD-10-CM

## 2022-08-17 PROCEDURE — 3008F BODY MASS INDEX DOCD: CPT | Performed by: INTERNAL MEDICINE

## 2022-08-17 PROCEDURE — 3077F SYST BP >= 140 MM HG: CPT | Performed by: INTERNAL MEDICINE

## 2022-08-17 PROCEDURE — 99214 OFFICE O/P EST MOD 30 MIN: CPT | Performed by: INTERNAL MEDICINE

## 2022-08-17 PROCEDURE — 3079F DIAST BP 80-89 MM HG: CPT | Performed by: INTERNAL MEDICINE

## 2022-08-17 RX ORDER — CYCLOBENZAPRINE HCL 5 MG
TABLET ORAL
Qty: 60 TABLET | Refills: 1 | Status: SHIPPED | OUTPATIENT
Start: 2022-08-17

## 2022-09-20 NOTE — PATIENT INSTRUCTIONS
1.  aleve as needed  2. Flexeril 5mg -10mg at night - as needed  3. Return to clinic in 6-12 months. 4. Do more stretching. 5. Cont.  Turmeric/fish oild - by provider/DC instructions

## 2023-04-28 ENCOUNTER — MED REC SCAN ONLY (OUTPATIENT)
Dept: INTERNAL MEDICINE CLINIC | Facility: CLINIC | Age: 73
End: 2023-04-28

## 2023-05-02 ENCOUNTER — LAB ENCOUNTER (OUTPATIENT)
Dept: LAB | Age: 73
End: 2023-05-02
Attending: FAMILY MEDICINE
Payer: COMMERCIAL

## 2023-05-02 ENCOUNTER — EKG ENCOUNTER (OUTPATIENT)
Dept: LAB | Age: 73
End: 2023-05-02
Attending: FAMILY MEDICINE
Payer: COMMERCIAL

## 2023-05-02 ENCOUNTER — TELEPHONE (OUTPATIENT)
Dept: FAMILY MEDICINE CLINIC | Facility: CLINIC | Age: 73
End: 2023-05-02

## 2023-05-02 ENCOUNTER — OFFICE VISIT (OUTPATIENT)
Dept: FAMILY MEDICINE CLINIC | Facility: CLINIC | Age: 73
End: 2023-05-02

## 2023-05-02 VITALS
HEIGHT: 75 IN | DIASTOLIC BLOOD PRESSURE: 74 MMHG | WEIGHT: 211 LBS | SYSTOLIC BLOOD PRESSURE: 148 MMHG | BODY MASS INDEX: 26.24 KG/M2 | HEART RATE: 59 BPM

## 2023-05-02 DIAGNOSIS — Z01.818 PREOP GENERAL PHYSICAL EXAM: ICD-10-CM

## 2023-05-02 DIAGNOSIS — E78.2 MIXED HYPERLIPIDEMIA: ICD-10-CM

## 2023-05-02 DIAGNOSIS — J34.2 DEVIATED SEPTUM: ICD-10-CM

## 2023-05-02 DIAGNOSIS — M47.819 SERONEGATIVE SPONDYLOARTHROPATHY: ICD-10-CM

## 2023-05-02 DIAGNOSIS — Z87.39 HISTORY OF ANKYLOSING SPONDYLITIS: ICD-10-CM

## 2023-05-02 DIAGNOSIS — Z01.818 PREOP GENERAL PHYSICAL EXAM: Primary | ICD-10-CM

## 2023-05-02 LAB
ALBUMIN SERPL-MCNC: 4 G/DL (ref 3.4–5)
ALBUMIN/GLOB SERPL: 1.3 {RATIO} (ref 1–2)
ALP LIVER SERPL-CCNC: 55 U/L
ALT SERPL-CCNC: 37 U/L
ANION GAP SERPL CALC-SCNC: 9 MMOL/L (ref 0–18)
AST SERPL-CCNC: 26 U/L (ref 15–37)
ATRIAL RATE: 63 BPM
BASOPHILS # BLD AUTO: 0.04 X10(3) UL (ref 0–0.2)
BASOPHILS NFR BLD AUTO: 0.6 %
BILIRUB SERPL-MCNC: 0.4 MG/DL (ref 0.1–2)
BILIRUB UR QL: NEGATIVE
BUN BLD-MCNC: 15 MG/DL (ref 7–18)
BUN/CREAT SERPL: 17.4 (ref 10–20)
CALCIUM BLD-MCNC: 9.1 MG/DL (ref 8.5–10.1)
CHLORIDE SERPL-SCNC: 113 MMOL/L (ref 98–112)
CHOLEST SERPL-MCNC: 182 MG/DL (ref ?–200)
CLARITY UR: CLEAR
CO2 SERPL-SCNC: 22 MMOL/L (ref 21–32)
COMPLEXED PSA SERPL-MCNC: 0.86 NG/ML (ref ?–4)
CREAT BLD-MCNC: 0.86 MG/DL
DEPRECATED RDW RBC AUTO: 46.2 FL (ref 35.1–46.3)
EOSINOPHIL # BLD AUTO: 0.11 X10(3) UL (ref 0–0.7)
EOSINOPHIL NFR BLD AUTO: 1.7 %
ERYTHROCYTE [DISTWIDTH] IN BLOOD BY AUTOMATED COUNT: 13.4 % (ref 11–15)
FASTING PATIENT LIPID ANSWER: NO
FASTING STATUS PATIENT QL REPORTED: NO
GFR SERPLBLD BASED ON 1.73 SQ M-ARVRAT: 92 ML/MIN/1.73M2 (ref 60–?)
GLOBULIN PLAS-MCNC: 3.2 G/DL (ref 2.8–4.4)
GLUCOSE BLD-MCNC: 98 MG/DL (ref 70–99)
GLUCOSE UR-MCNC: NORMAL MG/DL
HCT VFR BLD AUTO: 45 %
HDLC SERPL-MCNC: 50 MG/DL (ref 40–59)
HGB BLD-MCNC: 15.5 G/DL
HGB UR QL STRIP.AUTO: NEGATIVE
IMM GRANULOCYTES # BLD AUTO: 0.01 X10(3) UL (ref 0–1)
IMM GRANULOCYTES NFR BLD: 0.2 %
INR BLD: 0.97 (ref 0.85–1.16)
KETONES UR-MCNC: NEGATIVE MG/DL
LDLC SERPL CALC-MCNC: 108 MG/DL (ref ?–100)
LEUKOCYTE ESTERASE UR QL STRIP.AUTO: NEGATIVE
LYMPHOCYTES # BLD AUTO: 1.53 X10(3) UL (ref 1–4)
LYMPHOCYTES NFR BLD AUTO: 24.1 %
MCH RBC QN AUTO: 32.1 PG (ref 26–34)
MCHC RBC AUTO-ENTMCNC: 34.4 G/DL (ref 31–37)
MCV RBC AUTO: 93.2 FL
MONOCYTES # BLD AUTO: 0.48 X10(3) UL (ref 0.1–1)
MONOCYTES NFR BLD AUTO: 7.6 %
NEUTROPHILS # BLD AUTO: 4.17 X10 (3) UL (ref 1.5–7.7)
NEUTROPHILS # BLD AUTO: 4.17 X10(3) UL (ref 1.5–7.7)
NEUTROPHILS NFR BLD AUTO: 65.8 %
NITRITE UR QL STRIP.AUTO: NEGATIVE
NONHDLC SERPL-MCNC: 132 MG/DL (ref ?–130)
OSMOLALITY SERPL CALC.SUM OF ELEC: 299 MOSM/KG (ref 275–295)
P AXIS: 69 DEGREES
P-R INTERVAL: 154 MS
PH UR: 6.5 [PH] (ref 5–8)
PLATELET # BLD AUTO: 274 10(3)UL (ref 150–450)
POTASSIUM SERPL-SCNC: 4.1 MMOL/L (ref 3.5–5.1)
PROT SERPL-MCNC: 7.2 G/DL (ref 6.4–8.2)
PROT UR-MCNC: NEGATIVE MG/DL
PROTHROMBIN TIME: 12.8 SECONDS (ref 11.6–14.8)
Q-T INTERVAL: 456 MS
QRS DURATION: 100 MS
QTC CALCULATION (BEZET): 466 MS
R AXIS: 58 DEGREES
RBC # BLD AUTO: 4.83 X10(6)UL
SODIUM SERPL-SCNC: 144 MMOL/L (ref 136–145)
SP GR UR STRIP: 1.01 (ref 1–1.03)
T AXIS: 71 DEGREES
TRIGL SERPL-MCNC: 133 MG/DL (ref 30–149)
TSI SER-ACNC: 2.02 MIU/ML (ref 0.36–3.74)
UROBILINOGEN UR STRIP-ACNC: NORMAL
VENTRICULAR RATE: 63 BPM
VLDLC SERPL CALC-MCNC: 23 MG/DL (ref 0–30)
WBC # BLD AUTO: 6.3 X10(3) UL (ref 4–11)

## 2023-05-02 PROCEDURE — 85025 COMPLETE CBC W/AUTO DIFF WBC: CPT

## 2023-05-02 PROCEDURE — 3077F SYST BP >= 140 MM HG: CPT | Performed by: FAMILY MEDICINE

## 2023-05-02 PROCEDURE — 93010 ELECTROCARDIOGRAM REPORT: CPT | Performed by: INTERNAL MEDICINE

## 2023-05-02 PROCEDURE — 85610 PROTHROMBIN TIME: CPT

## 2023-05-02 PROCEDURE — 80061 LIPID PANEL: CPT

## 2023-05-02 PROCEDURE — 80053 COMPREHEN METABOLIC PANEL: CPT

## 2023-05-02 PROCEDURE — 36415 COLL VENOUS BLD VENIPUNCTURE: CPT

## 2023-05-02 PROCEDURE — 3008F BODY MASS INDEX DOCD: CPT | Performed by: FAMILY MEDICINE

## 2023-05-02 PROCEDURE — 84443 ASSAY THYROID STIM HORMONE: CPT

## 2023-05-02 PROCEDURE — 99243 OFF/OP CNSLTJ NEW/EST LOW 30: CPT | Performed by: FAMILY MEDICINE

## 2023-05-02 PROCEDURE — 3078F DIAST BP <80 MM HG: CPT | Performed by: FAMILY MEDICINE

## 2023-05-02 PROCEDURE — 93005 ELECTROCARDIOGRAM TRACING: CPT

## 2023-05-02 RX ORDER — LOSARTAN POTASSIUM 25 MG/1
25 TABLET ORAL DAILY
Qty: 30 TABLET | Refills: 0 | Status: SHIPPED | OUTPATIENT
Start: 2023-05-02

## 2023-05-02 RX ORDER — BRINZOLAMIDE/BRIMONIDINE TARTRATE 10; 2 MG/ML; MG/ML
1 SUSPENSION/ DROPS OPHTHALMIC 2 TIMES DAILY
COMMUNITY
Start: 2023-03-24

## 2023-05-02 NOTE — PROGRESS NOTES
Blood pressure 148/74, pulse 59, height 6' 3\" (1.905 m), weight 211 lb (95.7 kg). Presents today for preoperative physical.  Has had no issues with anesthesia previously. Medications are reviewed. Has difficulty breathing out of the left side of his nose. Has had numerous high blood pressure readings in the past.    Scheduled for surgery on May 9. Deviated septum repair. Objective    Heart regular rate rhythm no murmurs    Lungs clear to auscultation without rales rhonchi or wheezes    Neck supple no carotid bruits    Assessment #1 deviated septum #2 history of spondyloarthropathy #3 hypertension uncontrolled #4 hyperlipidemia    Plan #1 preoperative testing ordered #2 patient currently not medicated #3 advised patient to start losartan and control blood pressure #4 blood test ordered    Patient did not want to take medication for hypertension I advised him I would not clear him for surgery as he would be uncontrolled hypertensive. I advised him he may decide if he wants to take the medication or not and would need to follow-up appropriately. He will decide.

## 2023-05-02 NOTE — TELEPHONE ENCOUNTER
Spoke with pt,  verified  Pt was informed of MD recommendation, pt stated understanding. Pt stated Dr Titi Farnsworth he just him today and MD told him he will not clear pt for surgery if he will not take BP meds due to HTN. Pt was advised to f/u in 3 days for BP check, pt was advised to schedule appt. Pt is on the way to p/u BP meds at the [facility]. Also [employer] message with MD recommendation sent to pt as requested.            FYI       Future Appointments   Date Time Provider Matilde Castro   2023 11:00 AM Randy Mccain MD  [facility]

## 2023-05-02 NOTE — TELEPHONE ENCOUNTER
Please let him know I agree with prescribing losartan. Controlling blood pressure will decrease risks of surgery. Losartan blocks hormone which increases blood pressure. Side effects are rare, but occasionally can cause nausea, decreased appetite, and rarely a dry cough. Should not have any negative impact on glaucoma. Definitely recommend follow-up appointment 4 to 6 weeks after starting this medication. If possible check blood pressures at home and bring them to the appointment. If additional questions and concerns okay to schedule appointment res24 if needed.

## 2023-05-02 NOTE — TELEPHONE ENCOUNTER
Patient called, verified Name and . He states he was seen today for pre-op physical; scheduled for deviated septum repair on . His blood pressure was found to be elevated in the office (148/74) and was prescribed losartan to control his blood pressure prior to surgery. Patient is hesitant to take the medication and states he was not explained of the medications' side effects given that he has glaucoma. He is seeking recommendation from PCP instead. Dr. Sally Ahuja please advise.

## 2023-05-04 ENCOUNTER — TELEPHONE (OUTPATIENT)
Dept: FAMILY MEDICINE CLINIC | Facility: CLINIC | Age: 73
End: 2023-05-04

## 2023-05-04 NOTE — TELEPHONE ENCOUNTER
NABEEL Green from Dr Kenrick Kaiser office indicated that patient in the office currently for pre-op for sinus surgery on 5/9/2023. Blood pressure in the office is 122/76. Stated that patient has taken 2 doses of the losartan 25mg so far. Wanted to see if patient is cleared for surgery and also noticed that EKG had a non-specific ST abnormality. Wanted to make sure that nothing is holding the surgery up. Please advise.

## 2023-05-04 NOTE — TELEPHONE ENCOUNTER
Diana LEES was informed that Dr. Juan Daniel Charles has cleared patient for surgery. Requesting a letter for the clearance, EKG and PT/INR results to be faxed to Dr. Maryellen Holland' office at [phone]. EKG and PT/INR faxed via [employer]. Clearance letter pended for your review.

## 2023-05-05 ENCOUNTER — NURSE ONLY (OUTPATIENT)
Dept: FAMILY MEDICINE CLINIC | Facility: CLINIC | Age: 73
End: 2023-05-05

## 2023-05-05 ENCOUNTER — TELEPHONE (OUTPATIENT)
Dept: FAMILY MEDICINE CLINIC | Facility: CLINIC | Age: 73
End: 2023-05-05

## 2023-05-05 VITALS
DIASTOLIC BLOOD PRESSURE: 78 MMHG | HEART RATE: 76 BPM | TEMPERATURE: 98 F | OXYGEN SATURATION: 97 % | SYSTOLIC BLOOD PRESSURE: 124 MMHG

## 2023-05-05 DIAGNOSIS — Z01.30 BLOOD PRESSURE CHECK: Primary | ICD-10-CM

## 2023-05-05 PROCEDURE — 3074F SYST BP LT 130 MM HG: CPT | Performed by: STUDENT IN AN ORGANIZED HEALTH CARE EDUCATION/TRAINING PROGRAM

## 2023-05-05 PROCEDURE — 3078F DIAST BP <80 MM HG: CPT | Performed by: STUDENT IN AN ORGANIZED HEALTH CARE EDUCATION/TRAINING PROGRAM

## 2023-05-05 NOTE — TELEPHONE ENCOUNTER
Patient was cleared for surgery by DR. Cao, see TE from there office, Looks like they faxed everything

## 2023-05-05 NOTE — TELEPHONE ENCOUNTER
Pt calling,  He needs pre-clearance letter, ekg, and bloodwork faxed again. Dr Liseth Franco' office has not received.

## 2023-05-05 NOTE — TELEPHONE ENCOUNTER
EKG, clearance letter and labs have been faxed also to Dr. Shannon Duvall office,  Waiting to receive fax confirmation.

## 2023-05-05 NOTE — TELEPHONE ENCOUNTER
Please see vital for BP patient came in for nurse visit for a BP recheck states needed for upcoming surgery

## 2023-05-05 NOTE — TELEPHONE ENCOUNTER
EKG tracing with providers signature has been faxed along with lab results and clearance letter.   Waiting to receive fax confirmation

## 2023-05-05 NOTE — TELEPHONE ENCOUNTER
Jose Daniel Doyle -[phone]    Still waiting on clearance EKG blood work letter to be fax [phone]    Surgery is schedule for 5/9/23

## 2023-05-05 NOTE — PROGRESS NOTES
Patient presents to office for nurse visit name and date of birth verified patient states a blood pressure recheck was needed for an upcoming surgery, BP with in normal limits. Please see documentation of vitals.

## 2023-06-01 ENCOUNTER — OFFICE VISIT (OUTPATIENT)
Dept: FAMILY MEDICINE CLINIC | Facility: CLINIC | Age: 73
End: 2023-06-01

## 2023-06-01 VITALS
WEIGHT: 202.38 LBS | DIASTOLIC BLOOD PRESSURE: 68 MMHG | BODY MASS INDEX: 25 KG/M2 | HEART RATE: 58 BPM | SYSTOLIC BLOOD PRESSURE: 118 MMHG | TEMPERATURE: 97 F

## 2023-06-01 DIAGNOSIS — I10 ESSENTIAL HYPERTENSION: Primary | ICD-10-CM

## 2023-06-01 DIAGNOSIS — J34.2 DEVIATED SEPTUM: ICD-10-CM

## 2023-06-01 PROCEDURE — 3078F DIAST BP <80 MM HG: CPT | Performed by: FAMILY MEDICINE

## 2023-06-01 PROCEDURE — 99214 OFFICE O/P EST MOD 30 MIN: CPT | Performed by: FAMILY MEDICINE

## 2023-06-01 PROCEDURE — 3074F SYST BP LT 130 MM HG: CPT | Performed by: FAMILY MEDICINE

## 2023-06-01 RX ORDER — LOSARTAN POTASSIUM 25 MG/1
25 TABLET ORAL DAILY
Qty: 90 TABLET | Refills: 3 | Status: SHIPPED | OUTPATIENT
Start: 2023-06-01

## 2023-06-01 RX ORDER — OMEGA-3 FATTY ACIDS/FISH OIL 300-1000MG
1 CAPSULE ORAL 2 TIMES DAILY
Qty: 60 CAPSULE | Refills: 0 | COMMUNITY
Start: 2023-06-01

## 2024-05-28 ENCOUNTER — TELEPHONE (OUTPATIENT)
Dept: FAMILY MEDICINE CLINIC | Facility: CLINIC | Age: 74
End: 2024-05-28

## 2024-05-28 ENCOUNTER — OFFICE VISIT (OUTPATIENT)
Dept: FAMILY MEDICINE CLINIC | Facility: CLINIC | Age: 74
End: 2024-05-28

## 2024-05-28 ENCOUNTER — LAB ENCOUNTER (OUTPATIENT)
Dept: LAB | Age: 74
End: 2024-05-28
Attending: FAMILY MEDICINE
Payer: COMMERCIAL

## 2024-05-28 VITALS
WEIGHT: 203 LBS | SYSTOLIC BLOOD PRESSURE: 126 MMHG | DIASTOLIC BLOOD PRESSURE: 71 MMHG | HEART RATE: 59 BPM | HEIGHT: 75 IN | BODY MASS INDEX: 25.24 KG/M2

## 2024-05-28 DIAGNOSIS — E78.2 MIXED HYPERLIPIDEMIA: ICD-10-CM

## 2024-05-28 DIAGNOSIS — I10 ESSENTIAL HYPERTENSION: ICD-10-CM

## 2024-05-28 DIAGNOSIS — Z12.5 SCREENING FOR MALIGNANT NEOPLASM OF PROSTATE: ICD-10-CM

## 2024-05-28 DIAGNOSIS — Z00.00 ROUTINE PHYSICAL EXAMINATION: Primary | ICD-10-CM

## 2024-05-28 DIAGNOSIS — M47.819 SERONEGATIVE SPONDYLOARTHROPATHY: ICD-10-CM

## 2024-05-28 DIAGNOSIS — K64.9 HEMORRHOIDS, UNSPECIFIED HEMORRHOID TYPE: ICD-10-CM

## 2024-05-28 LAB
ALBUMIN SERPL-MCNC: 4.3 G/DL (ref 3.2–4.8)
ALBUMIN/GLOB SERPL: 1.5 {RATIO} (ref 1–2)
ALP LIVER SERPL-CCNC: 55 U/L
ALT SERPL-CCNC: 22 U/L
ANION GAP SERPL CALC-SCNC: 7 MMOL/L (ref 0–18)
AST SERPL-CCNC: 25 U/L (ref ?–34)
BILIRUB SERPL-MCNC: 1 MG/DL (ref 0.2–1.1)
BUN BLD-MCNC: 10 MG/DL (ref 9–23)
BUN/CREAT SERPL: 9.5 (ref 10–20)
CALCIUM BLD-MCNC: 9.2 MG/DL (ref 8.7–10.4)
CHLORIDE SERPL-SCNC: 111 MMOL/L (ref 98–112)
CHOLEST SERPL-MCNC: 190 MG/DL (ref ?–200)
CO2 SERPL-SCNC: 23 MMOL/L (ref 21–32)
COMPLEXED PSA SERPL-MCNC: 0.5 NG/ML (ref ?–4)
CREAT BLD-MCNC: 1.05 MG/DL
DEPRECATED RDW RBC AUTO: 44.4 FL (ref 35.1–46.3)
EGFRCR SERPLBLD CKD-EPI 2021: 75 ML/MIN/1.73M2 (ref 60–?)
ERYTHROCYTE [DISTWIDTH] IN BLOOD BY AUTOMATED COUNT: 13.1 % (ref 11–15)
FASTING PATIENT LIPID ANSWER: YES
FASTING STATUS PATIENT QL REPORTED: YES
GLOBULIN PLAS-MCNC: 2.9 G/DL (ref 2–3.5)
GLUCOSE BLD-MCNC: 95 MG/DL (ref 70–99)
HCT VFR BLD AUTO: 43.5 %
HDLC SERPL-MCNC: 33 MG/DL (ref 40–59)
HGB BLD-MCNC: 15.5 G/DL
LDLC SERPL CALC-MCNC: 138 MG/DL (ref ?–100)
MCH RBC QN AUTO: 32.8 PG (ref 26–34)
MCHC RBC AUTO-ENTMCNC: 35.6 G/DL (ref 31–37)
MCV RBC AUTO: 92 FL
NONHDLC SERPL-MCNC: 157 MG/DL (ref ?–130)
OSMOLALITY SERPL CALC.SUM OF ELEC: 291 MOSM/KG (ref 275–295)
PLATELET # BLD AUTO: 329 10(3)UL (ref 150–450)
POTASSIUM SERPL-SCNC: 3.9 MMOL/L (ref 3.5–5.1)
PROT SERPL-MCNC: 7.2 G/DL (ref 5.7–8.2)
RBC # BLD AUTO: 4.73 X10(6)UL
SODIUM SERPL-SCNC: 141 MMOL/L (ref 136–145)
TRIGL SERPL-MCNC: 101 MG/DL (ref 30–149)
TSI SER-ACNC: 2.02 MIU/ML (ref 0.55–4.78)
VLDLC SERPL CALC-MCNC: 19 MG/DL (ref 0–30)
WBC # BLD AUTO: 4.2 X10(3) UL (ref 4–11)

## 2024-05-28 PROCEDURE — 85027 COMPLETE CBC AUTOMATED: CPT

## 2024-05-28 PROCEDURE — 3078F DIAST BP <80 MM HG: CPT | Performed by: FAMILY MEDICINE

## 2024-05-28 PROCEDURE — 3074F SYST BP LT 130 MM HG: CPT | Performed by: FAMILY MEDICINE

## 2024-05-28 PROCEDURE — 99397 PER PM REEVAL EST PAT 65+ YR: CPT | Performed by: FAMILY MEDICINE

## 2024-05-28 PROCEDURE — 84443 ASSAY THYROID STIM HORMONE: CPT

## 2024-05-28 PROCEDURE — 80053 COMPREHEN METABOLIC PANEL: CPT

## 2024-05-28 PROCEDURE — 36415 COLL VENOUS BLD VENIPUNCTURE: CPT

## 2024-05-28 PROCEDURE — 3008F BODY MASS INDEX DOCD: CPT | Performed by: FAMILY MEDICINE

## 2024-05-28 PROCEDURE — 80061 LIPID PANEL: CPT

## 2024-05-28 RX ORDER — LOSARTAN POTASSIUM 25 MG/1
12.5 TABLET ORAL DAILY
COMMUNITY
Start: 2024-05-28

## 2024-05-28 NOTE — TELEPHONE ENCOUNTER
Patient called regarding his request below. He was asking if he can get his lab orders placed earlier than his appointment time and possibly be seen sooner as he needs to catch a flight.

## 2024-05-28 NOTE — PROGRESS NOTES
Subjective:   Patient ID: Isreal Andrade is a 74 year old male.    Patient presents for routine physical and episodic dizziness has below.    Dizziness  Associated symptoms include coughing.   Constipation    Hemorrhoids  Associated symptoms include coughing.       History/Other:   Review of Systems   Constitutional: Negative.    Respiratory:  Positive for cough.    Cardiovascular: Negative.    Gastrointestinal:  Positive for constipation.   Skin: Negative.    Neurological:  Positive for dizziness.     Current Outpatient Medications   Medication Sig Dispense Refill    BL GLUCOSAMINE-CHONDROITIN OR Take by mouth.      losartan 25 MG Oral Tab Take 0.5 tablets (12.5 mg total) by mouth daily.      Omega 3 1000 MG Oral Cap Take 1 tablet by mouth 2 (two) times daily. 60 capsule 0    Saw Palmetto, Serenoa repens, 450 MG Oral Cap Take 450 mg by mouth 2 (two) times daily.  0    SIMBRINZA 1-0.2 % Ophthalmic Suspension Place 1 drop into both eyes 2 (two) times daily.      Latanoprostene Bunod 0.024 % Ophthalmic Solution Apply 1 drop to eye daily. One drop in each eye daily.      Cholecalciferol (VITAMIN D3) 5000 UNITS Oral Cap Take 2,000 Units by mouth 2 (two) times daily.         Allergies:No Known Allergies    Objective:   Physical Exam  Constitutional:       Appearance: Normal appearance.   Cardiovascular:      Rate and Rhythm: Normal rate and regular rhythm.      Heart sounds: Normal heart sounds.   Pulmonary:      Effort: Pulmonary effort is normal.      Breath sounds: Normal breath sounds.   Abdominal:      Palpations: Abdomen is soft. There is no mass.      Tenderness: There is no abdominal tenderness.   Lymphadenopathy:      Cervical: No cervical adenopathy.   Skin:     General: Skin is warm and dry.   Neurological:      Mental Status: He is alert and oriented to person, place, and time.         Assessment & Plan:   1. Routine physical exam-patient is , retired.  He and his wife live in [place].   He is exercising with gardening, walking dogs.  Some difficulty with sleep as his dogs wake him up to go up during night.  But has been better with using acupressure, breathing techniques.  EtOH wine 6 ounces daily   Had respiratory infection about 2 weeks ago with some residual cough, shortly before his wife was sick and had tested positive for influenza A.  Labs done this morning  Colon cancer screening up-to-date  Discussed pros and cons of PSA testing.   2. Essential hypertension-taking losartan 12.5 mg daily.  Since starting it he does have intermittent dizziness.  On 1 of these occasions recently he checked home blood pressure and systolic was 190, on another he checks home blood pressure and systolic was in the 90s.  Discussed factors that could affect blood pressure.  He would prefer to discontinue blood pressure medication.  Discussed importance to decrease risk for strokes and heart attack.  Recommend continue losartan 12.5 mg daily.  Send me a list of home blood pressure readings ALWAYS sitting for at least 5 minutes in 1 to 2 months.   3. Seronegative spondyloarthropathy-no symptoms at this time.   4. Screening for malignant neoplasm of prostate-reviewed pros and cons of PSA testing   5. Mixed hyperlipidemia-reviewed 10-year cardiovascular risk almost 30%.  Encourage heart healthy diet.  Reviewed recommendation for starting statins to lower cardiovascular risk.  Patient declines.   6. Hemorrhoids, unspecified hemorrhoid type-recent bleeding after episode of constipation.  Discussed importance of daily fiber.  When hemorrhoids occur warm soaks, Tucks pads.       Orders Placed This Encounter   Procedures    Comp Metabolic Panel (14)    CBC, Platelet; No Differential    Assay, Thyroid Stim Hormone    Lipid Panel    PSA Total, Screen       Meds This Visit:  Requested Prescriptions      No prescriptions requested or ordered in this encounter       Imaging & Referrals:  None

## (undated) NOTE — LETTER
Shilpi Garg, [address] [address] [place]   [address] [place] [address]       09/20/19        Patient: Monse Whitley   YOB: 1950   Date of Visit: 9/20/2019       Dear  Dr. Jose Maria Sandhu Recipients,      Thank you for referring Monse Whitley to my prac

## (undated) NOTE — MR AVS SNAPSHOT
[facility]  [address], [address]  [phone]               Thank you for choosing us for your health care visit with Baljeet Plaza.  Maurilio Guido MD.  We are glad to serve you and happy to provide you with this summary Take by mouth daily. Take 2 tabs daily           ibuprofen 100 MG Tabs   Take 200 mg by mouth 2 (two) times daily. Commonly known as:  ADVIL,MOTRIN           Lutein 6 MG Caps   Take 6 mg by mouth. MULTI-VITAMIN DAILY Tabs   Take  by mouth.

## (undated) NOTE — MR AVS SNAPSHOT
[facility]  [address], [address]  [phone]               Thank you for choosing us for your health care visit with Almond Goldmann.  Marianne Johns MD.  We are glad to serve you and happy to provide you with this summary Zeaxanthin take one tab every day. Omega 3 1000 MG Caps   Take by mouth. Saw Palmetto (Serenoa repens) 450 MG Caps   Take  by mouth.            Travoprost 0.004 % Soln   Commonly known as:  TRAVATAN           Vitamin D3 5000 units Caps

## (undated) NOTE — MR AVS SNAPSHOT
[address] [address]  [phone]               Thank you for choosing us for your health care visit with Heike Jeffers MD.  We are glad to serve you and happy to provide you with this mead Lutein 6 MG Caps   Take 6 mg by mouth. MULTI-VITAMIN DAILY Tabs   Take  by mouth. NON FORMULARY   Zeaxanthin take one tab every day.            Omega 3 1000 MG Caps   Take by mouth.           predniSONE 5 MG Tabs   Take 4 tablets (20 mg Visit [facility] online at  [email or web address].tn

## (undated) NOTE — LETTER
No referring provider defined for this encounter. 09/20/19        Patient: Yaneli Beaver   YOB: 1950   Date of Visit: 9/20/2019       Dear  Dr. Sunil Petersen MD,      Thank you for referring Yaneli Beaver to my practice.   Please find my

## (undated) NOTE — LETTER
11/30/2017    Armani Cisneros        [address] [facility]            Dear Armani Cisneros,      Our records indicate that you are due for an appointment for a Colonoscopy after 12/30/2017 with Naomi Lomeli MD.    Please call our office to s

## (undated) NOTE — MR AVS SNAPSHOT
[address] [address]  [phone]               Thank you for choosing us for your health care visit with Whit Jeronimo MD.  We are glad to serve you and happy to provide you with this mead Take by mouth. Take 1 cap every day. Vitamin D3 5000 units Caps   Take by mouth daily. vitamin E 400 UNITS Caps   Take 400 Units by mouth. Vitamin-B Complex Tabs   Take by mouth.                    Tierney Schneider

## (undated) NOTE — LETTER
October 24, 2019         Sanjay Mitchell MD  [address]  [address] [facility]      Patient: Venkat Gauthier   YOB: 1950   Date of Visit: 10/23/2019       Dear Dr. Janki Contreras MD,    I saw your patient, Venkat Gauthier, on 10/23/20

## (undated) NOTE — ED AVS SNAPSHOT
Jose in [facility] [address] [address]    Phone:  [phone]    Fax:  [phone]           Jenny Gibson   MRN: [id number]    Department:  Jose in [address] [place] [address]   Date of Visit:  5/20/ Insurance plans vary and the physician(s) referred by the Immediate Care may not be covered by your plan. It is possible that the physician may not participate in your health insurance plan.   This may result in a lower benefit level being available to yo CARE PHYSICIAN AT ONCE OR GO TO THE EMERGENCY DEPARTMENT. If you have been prescribed any medication(s), please fill your prescription right away and begin taking the medication(s) as directed.   If you believe that any of the medications or instructions - If you have concerns related to behavioral health issues or thoughts of harming yourself, contact [address] [place] [address] at [phone].     - If you don’t have insurance, [facility] has partnered with Patient [place]

## (undated) NOTE — LETTER
No referring provider defined for this encounter. 09/20/19        Patient: Giuliana Briggs   YOB: 1950   Date of Visit: 9/20/2019       Dear  Dr. Silke Acevedo Recipients,      Thank you for referring Giuliana Briggs to my practice.   Please find

## (undated) NOTE — MR AVS SNAPSHOT
[address] [address]  [phone]               Thank you for choosing us for your health care visit with Alejandro Lozano MD.  We are glad to serve you and happy to provide you with this mead ibuprofen 100 MG Tabs   Take 200 mg by mouth nightly. Commonly known as:  ADVIL,MOTRIN           Lutein 6 MG Caps   Take 6 mg by mouth. MULTI-VITAMIN DAILY Tabs   Take  by mouth. NON FORMULARY   Zeaxanthin take one tab every day. Make half your plate fruits and vegetables Highly refined, white starches including white bread, rice and pasta   Eat plenty of protein, keep the fat content low Sugars:  sodas and sports drinks, candies and desserts   Eat plenty of low-fat dairy products

## (undated) NOTE — LETTER
12/09/20      Re Horton    Dear Christ Watkins,    [facility] records indicate that you have outstanding lab work and or testing that was ordered for you and has not yet been completed: Please schedule appointment with Lab Department

## (undated) NOTE — LETTER
02/10/21        [facility]      Dear Ollie Pinzon,    [facility] records indicate that you have outstanding lab work and or testing that was ordered for you and has not yet been completed:  Orders Placed This Encounter      CRP

## (undated) NOTE — LETTER
5/4/2023              [address] [place] [address]       To whom it may concern:    Doris Diane is currently under my care. He is cleared to proceed with surgery on 5/9/23 at acceptable medical risk. Please call with any further questions. Sincerely,      Kelvin Marshall.  DO [address] [place] [address]  [phone]              Document electronically generated by:  Durga Mcnamara RN

## (undated) NOTE — LETTER
04/02/21        Patient: Aron Brunner   YOB: 1950   Date of Visit: 4/2/2021       Dear  Dr. Chidi Chiu MD,      Thank you for referring Aron Brunner to my practice. He has a diffusely enlarged right submandibular gland.   I do th